# Patient Record
Sex: FEMALE | Race: WHITE
[De-identification: names, ages, dates, MRNs, and addresses within clinical notes are randomized per-mention and may not be internally consistent; named-entity substitution may affect disease eponyms.]

---

## 2018-07-20 ENCOUNTER — HOSPITAL ENCOUNTER (EMERGENCY)
Dept: HOSPITAL 47 - EC | Age: 68
Discharge: HOME | End: 2018-07-20
Payer: MEDICARE

## 2018-07-20 VITALS
SYSTOLIC BLOOD PRESSURE: 141 MMHG | DIASTOLIC BLOOD PRESSURE: 84 MMHG | RESPIRATION RATE: 17 BRPM | TEMPERATURE: 97.6 F | HEART RATE: 77 BPM

## 2018-07-20 DIAGNOSIS — Z79.899: ICD-10-CM

## 2018-07-20 DIAGNOSIS — L03.115: Primary | ICD-10-CM

## 2018-07-20 DIAGNOSIS — Z79.01: ICD-10-CM

## 2018-07-20 DIAGNOSIS — Z86.79: ICD-10-CM

## 2018-07-20 DIAGNOSIS — Z86.718: ICD-10-CM

## 2018-07-20 LAB
BASOPHILS # BLD AUTO: 0 K/UL (ref 0–0.2)
BASOPHILS NFR BLD AUTO: 0 %
EOSINOPHIL # BLD AUTO: 0.1 K/UL (ref 0–0.7)
EOSINOPHIL NFR BLD AUTO: 2 %
ERYTHROCYTE [DISTWIDTH] IN BLOOD BY AUTOMATED COUNT: 3.97 M/UL (ref 3.8–5.4)
ERYTHROCYTE [DISTWIDTH] IN BLOOD: 13 % (ref 11.5–15.5)
HCT VFR BLD AUTO: 36.6 % (ref 34–46)
HGB BLD-MCNC: 12.8 GM/DL (ref 11.4–16)
LYMPHOCYTES # SPEC AUTO: 1.2 K/UL (ref 1–4.8)
LYMPHOCYTES NFR SPEC AUTO: 17 %
MCH RBC QN AUTO: 32.2 PG (ref 25–35)
MCHC RBC AUTO-ENTMCNC: 35 G/DL (ref 31–37)
MCV RBC AUTO: 92 FL (ref 80–100)
MONOCYTES # BLD AUTO: 0.5 K/UL (ref 0–1)
MONOCYTES NFR BLD AUTO: 7 %
NEUTROPHILS # BLD AUTO: 5 K/UL (ref 1.3–7.7)
NEUTROPHILS NFR BLD AUTO: 72 %
PLATELET # BLD AUTO: 225 K/UL (ref 150–450)
WBC # BLD AUTO: 6.9 K/UL (ref 3.8–10.6)

## 2018-07-20 PROCEDURE — 93971 EXTREMITY STUDY: CPT

## 2018-07-20 PROCEDURE — 85025 COMPLETE CBC W/AUTO DIFF WBC: CPT

## 2018-07-20 PROCEDURE — 36415 COLL VENOUS BLD VENIPUNCTURE: CPT

## 2018-07-20 PROCEDURE — 86140 C-REACTIVE PROTEIN: CPT

## 2018-07-20 PROCEDURE — 99284 EMERGENCY DEPT VISIT MOD MDM: CPT

## 2018-07-20 NOTE — US
EXAMINATION TYPE: US venous doppler duplex LE RT

 

DATE OF EXAM: 7/20/2018 9:17 PM

 

COMPARISON: NONE

 

CLINICAL HISTORY: Pain.

 

SIDE PERFORMED: Right  

 

TECHNIQUE:  The lower extremity deep venous system is examined utilizing real time linear array sonog
aureliano with graded compression, doppler sonography and color-flow sonography.

 

VESSELS IMAGED:

External Iliac Vein (EIV)

Common Femoral Vein

Deep Femoral Vein

Greater Saphenous Vein *

Femoral Vein

Popliteal Vein

Small Saphenous Vein *

Proximal Calf Veins

(* superficial vessels)

 

Patient of large body habitus.

 

Right Leg:  Negative for DVT

 

 

 

 

IMPRESSION: Normal exam. No evidence of deep venous thrombosis in the right leg.

## 2018-07-20 NOTE — ED
General Adult HPI





- General


Chief complaint: Extremity Problem,Nontraumatic


Stated complaint: poss DVT


Time Seen by Provider: 07/20/18 21:07


Source: patient, family


Mode of arrival: ambulatory


Limitations: no limitations





- History of Present Illness


Initial comments: 


Female presents with the right leg swelling, redness ongoing for about 24 hours 

now it's painful and numb right calf has swollen in size, she has a history of 

DVT in the right leg and now she is concerned about swelling on the front part 

of the leg below the knee.  She has a history of peripheral vascular disease 

she swallows up with [facility].  She denies any trauma or fall 

onto her right knee denies any trauma no fever no chills no chest pain no 

pleuritic chest pain or abdominal pain no frequency urgency dysuria no symptoms 

of TIA or CVA








- Related Data


 Home Medications











 Medication  Instructions  Recorded  Confirmed


 


Atorvastatin [Lipitor] 20 mg PO DAILY 07/20/18 07/20/18


 


Calcium Carbonate/Vitamin D3 1 tab PO DAILY 07/20/18 07/20/18





[Calcium 600-Vit D3 400 Caplet]   


 


Celecoxib [CeleBREX] 200 mg PO DAILY 07/20/18 07/20/18


 


Cholecalciferol [Vitamin D3] 1,000 unit PO QID 07/20/18 07/20/18


 


Warfarin [Coumadin] 5 mg PO MOWEFR 07/20/18 07/20/18


 


Warfarin [Coumadin] 5 mg PO SUTUTHSA 07/20/18 07/20/18








 Previous Rx's











 Medication  Instructions  Recorded


 


Amoxicillin/Potassium Clav 1 tab PO Q12HR #20 tab 07/20/18





[Augmentin 875-125 Tablet]  











 Allergies











Allergy/AdvReac Type Severity Reaction Status Date / Time


 


No Known Allergies Allergy   Verified 07/20/18 21:34














Review of Systems


ROS Statement: 


Those systems with pertinent positive or pertinent negative responses have been 

documented in the HPI.





ROS Other: All systems not noted in ROS Statement are negative.





Past Medical History


Additional Past Medical History / Comment(s): right leg swelling


History of Any Multi-Drug Resistant Organisms: None Reported


Additional Past Surgical History / Comment(s): vascular surgery right leg


Past Psychological History: No Psychological Hx Reported


Smoking Status: Never smoker


Past Alcohol Use History: None Reported


Past Drug Use History: None Reported





General Exam





- General Exam Comments


Initial Comments: 


General:  The patient is awake and alert, in no distress, and does not appear 

acutely ill. 


Skin:  Skin is warm and examination of the right leg shows circumference of the 

right calf is larger than the left one she also has a area about 10 x 10 cm 

below the knee is erythematous is warm and is tender to touch she is also 

tender over the posterior side of the right lower extremity, no neurovascular 

compromise noticed of the right distal leg and the foot 


Eye:  Pupils are equal, round and reactive to light, extra-ocular movements are 

intact; there is normal conjunctiva bilaterally.  


Ears, nose, mouth and throat:  There are moist mucous membranes and no oral 

lesions. 


Neck:  The neck is supple, there is no tenderness  or JVD.  


Cardiovascular:  There is a regular rate and rhythm. No murmur, rub or gallop 

is appreciated.


Respiratory: To auscultation bilateral, no wheezing no rhonchi no distress  

respiratory wise noticed


Gastrointestinal:  Soft, non-distended, non-tender abdomen without masses or 

organomegaly noted. There is no rebound or guarding present. Bowel sounds are 

unremarkable. 


Back:  There is no tenderness to palpation in the midline. There is no obvious 

deformity.


Musculoskeletal:  Normal ROM, no tenderness, There is no pedal edema. There is 

no calf tenderness or swelling. No cords were appreciated.  


Neurological:  CN II-XII intact, Cranial nerves III through XII are intact. 

There are no obvious motor or sensory deficits. Coordination appears grossly 

intact. Speech is normal.


Psychiatric:  Cooperative, appropriate mood & affect, normal judgment.  








Limitations: no limitations





Course


 Vital Signs











  07/20/18





  19:39


 


Temperature 98.7 F


 


Pulse Rate 98


 


Respiratory 20





Rate 


 


Blood Pressure 154/66


 


O2 Sat by Pulse 97





Oximetry 














Medical Decision Making





- Lab Data


Result diagrams: 


 07/20/18 21:30





 Lab Results











  07/20/18 07/20/18 Range/Units





  21:30 21:30 


 


WBC   6.9  (3.8-10.6)  k/uL


 


RBC   3.97  (3.80-5.40)  m/uL


 


Hgb   12.8  (11.4-16.0)  gm/dL


 


Hct   36.6  (34.0-46.0)  %


 


MCV   92.0  (80.0-100.0)  fL


 


MCH   32.2  (25.0-35.0)  pg


 


MCHC   35.0  (31.0-37.0)  g/dL


 


RDW   13.0  (11.5-15.5)  %


 


Plt Count   225  (150-450)  k/uL


 


Neutrophils %   72  %


 


Lymphocytes %   17  %


 


Monocytes %   7  %


 


Eosinophils %   2  %


 


Basophils %   0  %


 


Neutrophils #   5.0  (1.3-7.7)  k/uL


 


Lymphocytes #   1.2  (1.0-4.8)  k/uL


 


Monocytes #   0.5  (0-1.0)  k/uL


 


Eosinophils #   0.1  (0-0.7)  k/uL


 


Basophils #   0.0  (0-0.2)  k/uL


 


C-Reactive Protein  48.2 H   (<10.0)  mg/L














Disposition


Clinical Impression: 


 Right leg swelling, Cellulitis, H/O peripheral vascular disease





Disposition: HOME SELF-CARE


Condition: Good


Instructions:  Cellulitis (ED)


Additional Instructions: 


She is visiting [place] she plans to go back home in 2 days she was advised 

to follow-up with family doctor or return to the ER if symptoms get worse


Prescriptions: 


Amoxicillin/Potassium Clav [Augmentin 875-125 Tablet] 1 tab PO Q12HR #20 tab


Is patient prescribed a controlled substance at d/c from ED?: No


Referrals: 


Nonstaff,Physician [Primary Care Provider] - 1-2 days

## 2021-10-18 DIAGNOSIS — I83.893 VARICOSE VEINS OF BOTH LEGS WITH EDEMA: ICD-10-CM

## 2021-10-18 DIAGNOSIS — I89.0 LYMPHEDEMA OF BOTH LOWER EXTREMITIES: ICD-10-CM

## 2021-10-18 RX ORDER — ATORVASTATIN CALCIUM 20 MG/1
20 TABLET, FILM COATED ORAL DAILY
COMMUNITY

## 2021-10-18 RX ORDER — WARFARIN SODIUM 5 MG/1
5 TABLET ORAL
COMMUNITY

## 2021-10-18 RX ORDER — PHENOL 1.4 %
1 AEROSOL, SPRAY (ML) MUCOUS MEMBRANE DAILY
COMMUNITY

## 2021-10-18 RX ORDER — METHYLPREDNISOLONE 4 MG/1
4 TABLET ORAL SEE ADMIN INSTRUCTIONS
COMMUNITY
End: 2021-11-23

## 2021-10-26 ENCOUNTER — HOSPITAL ENCOUNTER (OUTPATIENT)
Dept: LAB | Age: 71
Discharge: HOME | End: 2021-10-26
Payer: MEDICARE

## 2021-10-26 ENCOUNTER — HOSPITAL ENCOUNTER (OUTPATIENT)
Dept: WOUND CARE | Age: 71
Discharge: HOME | End: 2021-10-27
Payer: MEDICARE

## 2021-10-26 VITALS
SYSTOLIC BLOOD PRESSURE: 130 MMHG | WEIGHT: 212 LBS | BODY MASS INDEX: 39.01 KG/M2 | HEART RATE: 98 BPM | TEMPERATURE: 98.4 F | DIASTOLIC BLOOD PRESSURE: 88 MMHG | HEIGHT: 62 IN | RESPIRATION RATE: 20 BRPM

## 2021-10-26 DIAGNOSIS — Z79.01 ANTICOAGULATED: ICD-10-CM

## 2021-10-26 DIAGNOSIS — I87.2 VENOUS STASIS DERMATITIS OF RIGHT LOWER EXTREMITY: ICD-10-CM

## 2021-10-26 DIAGNOSIS — I83.893 VARICOSE VEINS OF BOTH LEGS WITH EDEMA: Primary | ICD-10-CM

## 2021-10-26 DIAGNOSIS — I83.013 VENOUS STASIS ULCER OF ANKLE, RIGHT (HCC): ICD-10-CM

## 2021-10-26 DIAGNOSIS — E78.49 OTHER HYPERLIPIDEMIA: ICD-10-CM

## 2021-10-26 DIAGNOSIS — Z86.718 HISTORY OF DVT (DEEP VEIN THROMBOSIS): ICD-10-CM

## 2021-10-26 DIAGNOSIS — E66.09 CLASS 2 OBESITY DUE TO EXCESS CALORIES WITH BODY MASS INDEX (BMI) OF 38.0 TO 38.9 IN ADULT, UNSPECIFIED WHETHER SERIOUS COMORBIDITY PRESENT: ICD-10-CM

## 2021-10-26 DIAGNOSIS — E80.6 HYPERBILIRUBINEMIA: ICD-10-CM

## 2021-10-26 DIAGNOSIS — I87.2 VENOUS INSUFFICIENCY: ICD-10-CM

## 2021-10-26 DIAGNOSIS — L97.319 VENOUS STASIS ULCER OF ANKLE, RIGHT (HCC): ICD-10-CM

## 2021-10-26 PROBLEM — E66.9 CLASS 2 OBESITY IN ADULT: Status: ACTIVE | Noted: 2021-10-26

## 2021-10-26 LAB
ABSOLUTE EOS #: 0.09 K/UL (ref 0–0.44)
ABSOLUTE IMMATURE GRANULOCYTE: <0.03 K/UL (ref 0–0.3)
ABSOLUTE LYMPH #: 1.03 K/UL (ref 1.1–3.7)
ABSOLUTE MONO #: 0.46 K/UL (ref 0.1–1.2)
ALBUMIN SERPL-MCNC: 4.4 G/DL (ref 3.5–5.2)
ALBUMIN/GLOBULIN RATIO: 1.7 (ref 1–2.5)
ALP BLD-CCNC: 98 U/L (ref 35–104)
ALT SERPL-CCNC: 28 U/L (ref 5–33)
ANION GAP SERPL CALCULATED.3IONS-SCNC: 10 MMOL/L (ref 9–17)
AST SERPL-CCNC: 23 U/L
BASOPHILS # BLD: 1 % (ref 0–2)
BASOPHILS ABSOLUTE: 0.04 K/UL (ref 0–0.2)
BILIRUB SERPL-MCNC: 2.03 MG/DL (ref 0.3–1.2)
BUN BLDV-MCNC: 19 MG/DL (ref 8–23)
BUN/CREAT BLD: 28 (ref 9–20)
C-REACTIVE PROTEIN: 7 MG/L (ref 0–5)
CALCIUM SERPL-MCNC: 10 MG/DL (ref 8.6–10.4)
CHLORIDE BLD-SCNC: 103 MMOL/L (ref 98–107)
CO2: 28 MMOL/L (ref 20–31)
CREAT SERPL-MCNC: 0.69 MG/DL (ref 0.5–0.9)
DIFFERENTIAL TYPE: ABNORMAL
EOSINOPHILS RELATIVE PERCENT: 2 % (ref 1–4)
GFR AFRICAN AMERICAN: >60 ML/MIN
GFR NON-AFRICAN AMERICAN: >60 ML/MIN
GFR SERPL CREATININE-BSD FRML MDRD: ABNORMAL ML/MIN/{1.73_M2}
GFR SERPL CREATININE-BSD FRML MDRD: ABNORMAL ML/MIN/{1.73_M2}
GLUCOSE BLD-MCNC: 97 MG/DL (ref 70–99)
HCT VFR BLD CALC: 42.5 % (ref 36.3–47.1)
HEMOGLOBIN: 14.4 G/DL (ref 11.9–15.1)
IMMATURE GRANULOCYTES: 0 %
LYMPHOCYTES # BLD: 18 % (ref 24–43)
MCH RBC QN AUTO: 32.9 PG (ref 25.2–33.5)
MCHC RBC AUTO-ENTMCNC: 33.9 G/DL (ref 25.2–33.5)
MCV RBC AUTO: 97 FL (ref 82.6–102.9)
MONOCYTES # BLD: 8 % (ref 3–12)
NRBC AUTOMATED: 0 PER 100 WBC
PDW BLD-RTO: 12.2 % (ref 11.8–14.4)
PLATELET # BLD: 233 K/UL (ref 138–453)
PLATELET ESTIMATE: ABNORMAL
PMV BLD AUTO: 10.6 FL (ref 8.1–13.5)
POTASSIUM SERPL-SCNC: 4 MMOL/L (ref 3.7–5.3)
RBC # BLD: 4.38 M/UL (ref 3.95–5.11)
RBC # BLD: ABNORMAL 10*6/UL
SEG NEUTROPHILS: 71 % (ref 36–65)
SEGMENTED NEUTROPHILS ABSOLUTE COUNT: 4.03 K/UL (ref 1.5–8.1)
SODIUM BLD-SCNC: 141 MMOL/L (ref 135–144)
TOTAL PROTEIN: 7 G/DL (ref 6.4–8.3)
WBC # BLD: 5.7 K/UL (ref 3.5–11.3)
WBC # BLD: ABNORMAL 10*3/UL

## 2021-10-26 PROCEDURE — 36415 COLL VENOUS BLD VENIPUNCTURE: CPT

## 2021-10-26 PROCEDURE — 80053 COMPREHEN METABOLIC PANEL: CPT

## 2021-10-26 PROCEDURE — 87176 TISSUE HOMOGENIZATION CULTR: CPT

## 2021-10-26 PROCEDURE — 87205 SMEAR GRAM STAIN: CPT

## 2021-10-26 PROCEDURE — 86140 C-REACTIVE PROTEIN: CPT

## 2021-10-26 PROCEDURE — 87075 CULTR BACTERIA EXCEPT BLOOD: CPT

## 2021-10-26 PROCEDURE — 85025 COMPLETE CBC W/AUTO DIFF WBC: CPT

## 2021-10-26 PROCEDURE — 87070 CULTURE OTHR SPECIMN AEROBIC: CPT

## 2021-10-26 PROCEDURE — 99213 OFFICE O/P EST LOW 20 MIN: CPT

## 2021-10-26 RX ORDER — CELECOXIB 200 MG/1
CAPSULE ORAL
COMMUNITY
Start: 2021-10-06 | End: 2021-10-26

## 2021-10-26 RX ORDER — CELECOXIB 200 MG/1
CAPSULE ORAL
COMMUNITY
Start: 2021-04-30

## 2021-10-26 NOTE — PROGRESS NOTES
Referred by Dr. Tony Sorto for management of right foot/ankle wound. Not clear how long this wound has been present but she has had issues on and off for a long time. For quite a while she was being managed by vascular team in [facility] but apparently now lives in [facility] and is switching her care to local doctors. She has had quite a few vascular procedures in the past including vein ablation and injections. She has an ulcer on her right foot medial surface just inferior and posterior to her medial malleolus. She has been treating with topical antibiotics and she chronically uses compression stockings. Is mildly tender. She is getting quite a bit of serous drainage from the wound. Past Medical History:   Diagnosis Date    DVT (deep venous thrombosis) (HCC)     RLE    Osteoarthritis      Past Surgical History:   Procedure Laterality Date    COLONOSCOPY  2012    HYSTERECTOMY, TOTAL ABDOMINAL  1999     Current Outpatient Medications   Medication Sig Dispense Refill    mupirocin (BACTROBAN) 2 % ointment       celecoxib (CELEBREX) 200 MG capsule take 1 capsule by mouth once daily      atorvastatin (LIPITOR) 20 MG tablet Take 20 mg by mouth daily      calcium carbonate 600 MG TABS tablet Take 1 tablet by mouth daily      vitamin D (CHOLECALCIFEROL) 25 MCG (1000 UT) TABS tablet Take 1,000 Units by mouth daily Takes 4x/day. Per pharmacy as directed      methylPREDNISolone (MEDROL DOSEPACK) 4 MG tablet Take 4 mg by mouth See Admin Instructions Take by mouth.  warfarin (COUMADIN) 5 MG tablet Take 5 mg by mouth Takes 1-1.5 tabs (5-7.5 mg total) by mouth daily. No current facility-administered medications for this encounter.      No Known Allergies  Social History     Tobacco Use    Smoking status: Never Smoker    Smokeless tobacco: Never Used   Vaping Use    Vaping Use: Never used   Substance Use Topics    Alcohol use: Not Currently     Alcohol/week: 0.0 - 1.0 standard drinks    Drug use: Never     Family History   Problem Relation Age of Onset    Diabetes Mother     Coronary Art Dis Mother     Arthritis Mother     High Blood Pressure Mother     Cancer Father        /88   Pulse 98   Temp 98.4 °F (36.9 °C) (Tympanic)   Resp 20   Ht 5' 2\" (1.575 m)   Wt 212 lb (96.2 kg)   BMI 38.78 kg/m²         Krysten shaped ulcer, leaking/dripping serous fluid. Layer of yellowish fibrin. Mildly tender. Her leg shows deep hyperpigmentation and some sclerotic change of the skin consistent with venous stasis. .  She has varicosity above and below the abnormal skin. She has good pedal pulsed. She has thicken nails    Procedure note:  Carlos Balm wound(s) debrided. Devitalized, necrotic, fibrinous material and biofilm was removed. Hemostasis by direct pressure as needed. Instruments used:   Curette: Yes   Forceps and scissors:  No   Scalpel: No   Tissue nippers or rongeur: No  Additional injected local anesthetic: No  Tissue obtained for culture: Yes  Additional hemostatic agents used:   Silver Nitrate: No   Electrocautery: No   Sutures: No   Topical agents (gel foam, thrombin, Surgicel): No  Depth of Debridement - full thickness  Debridement Size:  Roughly 5.5 cm2        IMP/PLAN  1) Venous stasis ulcer left foot  - base line lab work  - Compression dressing - full compression  - Silver collagen  - Secondary absorptive dressing  - Recheck in 1 week.   2) Venous stasis dermatitis

## 2021-11-01 LAB
CULTURE: NORMAL
DIRECT EXAM: NORMAL
DIRECT EXAM: NORMAL
Lab: NORMAL
SPECIMEN DESCRIPTION: NORMAL

## 2021-11-02 ENCOUNTER — HOSPITAL ENCOUNTER (OUTPATIENT)
Dept: WOUND CARE | Age: 71
Discharge: HOME | End: 2021-11-03
Payer: MEDICARE

## 2021-11-02 VITALS
WEIGHT: 211.2 LBS | BODY MASS INDEX: 38.87 KG/M2 | SYSTOLIC BLOOD PRESSURE: 130 MMHG | DIASTOLIC BLOOD PRESSURE: 88 MMHG | TEMPERATURE: 97.3 F | HEART RATE: 84 BPM | RESPIRATION RATE: 18 BRPM | HEIGHT: 62 IN

## 2021-11-02 PROCEDURE — 29581 APPL MULTLAYER CMPRN SYS LEG: CPT

## 2021-11-02 PROCEDURE — 11042 DBRDMT SUBQ TIS 1ST 20SQCM/<: CPT | Performed by: SURGERY

## 2021-11-02 PROCEDURE — 99212 OFFICE O/P EST SF 10 MIN: CPT

## 2021-11-02 NOTE — PROGRESS NOTES
Referred by Dr. Kady Ram for management of right foot/ankle wound. Not clear how long this wound has been present but she has had issues on and off for a long time. For quite a while she was being managed by vascular team in [facility] but apparently now lives in [facility] and is switching her care to local doctors. She has had quite a few vascular procedures in the past including vein ablation and injections. Her first visit with us was 10/26/2021    Compression and silver collagen    /88   Pulse 84   Temp 97.3 °F (36.3 °C) (Tympanic)   Resp 18   Ht 5' 2\" (1.575 m)   Wt 211 lb 3.2 oz (95.8 kg)   BMI 38.63 kg/m²     Her lab work was largely okay. She has a mildly elevated bilirubin. The wound is a little more distinct this week. The is one bryan shaped ulcer that has irregular borders, and several smaller surrouding ulcers. It was discolored by the silver collagen. It is clearly full thickness. Measure a little smaller this week, perhaps because the borders of the ulcer are more distinct. Procedure note:  Levora Zachary wound(s) debrided. Devitalized, necrotic, fibrinous material and biofilm was removed. Hemostasis by direct pressure as needed. Instruments used:   Curette: Yes   Forceps and scissors:  No   Scalpel: No   Tissue nippers or rongeur: No  Additional injected local anesthetic: No  Tissue obtained for culture: No  Additional hemostatic agents used:   Silver Nitrate: No   Electrocautery: No   Sutures: No   Topical agents (gel foam, thrombin, Surgicel): No  Depth of Debridement - full thickness  Debridement Size:  Roughly 4 cm2    Culture from last week was negative. IMP/PLAN    1)  Continue compression and silver collagen  - I suspect this is not going to heal very well, but will give it one more week  - Consider the use of skin substitutes - Appligraft, Dermagraft, Puraply or similar.   2) Recheck in 1 week

## 2021-11-09 ENCOUNTER — HOSPITAL ENCOUNTER (OUTPATIENT)
Dept: WOUND CARE | Age: 71
Discharge: HOME | End: 2021-11-10
Payer: MEDICARE

## 2021-11-09 VITALS
HEIGHT: 62 IN | RESPIRATION RATE: 18 BRPM | DIASTOLIC BLOOD PRESSURE: 74 MMHG | SYSTOLIC BLOOD PRESSURE: 126 MMHG | TEMPERATURE: 99 F | WEIGHT: 211 LBS | BODY MASS INDEX: 38.83 KG/M2 | HEART RATE: 72 BPM

## 2021-11-09 PROCEDURE — 99213 OFFICE O/P EST LOW 20 MIN: CPT

## 2021-11-09 PROCEDURE — 11042 DBRDMT SUBQ TIS 1ST 20SQCM/<: CPT | Performed by: SURGERY

## 2021-11-09 PROCEDURE — 29581 APPL MULTLAYER CMPRN SYS LEG: CPT

## 2021-11-09 PROCEDURE — 86403 PARTICLE AGGLUT ANTBDY SCRN: CPT

## 2021-11-09 PROCEDURE — 87075 CULTR BACTERIA EXCEPT BLOOD: CPT

## 2021-11-09 PROCEDURE — 15271 SKIN SUB GRAFT TRNK/ARM/LEG: CPT | Performed by: SURGERY

## 2021-11-09 PROCEDURE — C5271 LOW COST SKIN SUBSTITUTE APP: HCPCS | Performed by: SURGERY

## 2021-11-09 PROCEDURE — 87070 CULTURE OTHR SPECIMN AEROBIC: CPT

## 2021-11-09 PROCEDURE — 87205 SMEAR GRAM STAIN: CPT

## 2021-11-09 PROCEDURE — 87176 TISSUE HOMOGENIZATION CULTR: CPT

## 2021-11-09 PROCEDURE — 87186 SC STD MICRODIL/AGAR DIL: CPT

## 2021-11-09 NOTE — PROGRESS NOTES
PuraPly AMTreatment Note    NAME:  Avram Frankel  YOB: 1950  MEDICAL RECORD NUMBER:  [id number]  DATE:  11/9/2021    Goal:  Patient will receive safe and proper application of skin substitute. Patient will comply with caring for dressing, and reporting complications. Expiration date checked immediately prior to use. Package intact prior to use and no damage noted. Transport temperature controlled and acceptable. PuraPly AM was removed from protective sterile packaging by provider and applied to prepared ulcer bed. PuraPly AM was hydrated with sterile normal saline per provider. PuraPly AM was applied to right ankle and affixed with steri-strips by the provider. PuraPly AM was covered with non-adherent ulcer dressing. Applied dry gauze and/or roll gauze. Patient/caregiver was instructed not to remove dressing and to keep it clean and dry. Pt/family/caregiver was instructed on signs and symptoms of complications to report such as draining through dressing, dressing falling down/slipping, getting wet, or severe pain or tingling. PuraPly AM may be applied a total of 10 times per wound over a 12 week period. Date of first application of PuraPly for this current wound is November 9, 2021.     Guidelines followed   Puraply 2x4   5/12 application     Electronically signed by Genoveva Aguilar RN on 11/9/2021 at 2:01 PM

## 2021-11-09 NOTE — DISCHARGE INSTR - COC
Continuity of Care Form    Patient Name: Rudy Keene   :  1950  MRN:  [id number]    Admit date:  2021  Discharge date:  ***    Code Status Order: No Order   Advance Directives:      Admitting Physician:  No admitting provider for patient encounter. PCP: Austin Nguyen DO    Discharging Nurse: [facility] Unit/Room#: No information available for this encounter. Discharging Unit Phone Number: ***    Emergency Contact:   Extended Emergency Contact Information  Primary Emergency Contact: [employer]  Mobile Phone: [phone]  Relation: Spouse    Past Surgical History:  Past Surgical History:   Procedure Laterality Date    COLONOSCOPY      HYSTERECTOMY, TOTAL ABDOMINAL         Immunization History: There is no immunization history for the selected administration types on file for this patient.     Active Problems:  Patient Active Problem List   Diagnosis Code    Lymphedema of both lower extremities I89.0    Varicose veins of both legs with edema I83.893    Venous stasis ulcer of ankle, right (HCC) I83.013, L97.319    Hyperbilirubinemia E80.6    History of DVT (deep vein thrombosis) Z86.718    Other hyperlipidemia E78.49    Class 2 obesity in adult E66.9    Anticoagulated Z79.01    Venous stasis dermatitis of right lower extremity I87.2    Venous insufficiency I87.2       Isolation/Infection:   Isolation            No Isolation          Patient Infection Status       None to display            Nurse Assessment:  Last Vital Signs: /74   Pulse 72   Temp 99 °F (37.2 °C) (Tympanic)   Resp 18   Ht 5' 2\" (1.575 m)   Wt 211 lb (95.7 kg)   BMI 38.59 kg/m²     Last documented pain score (0-10 scale): Pain Level: 0  Last Weight:   Wt Readings from Last 1 Encounters:   21 211 lb (95.7 kg)     Mental Status:  {IP PT MENTAL STATUS:20050}    IV Access:  { TIFFANI IV ACCESS:452198049}    Nursing Mobility/ADLs:  Walking   {[facility] BEHO:513765218}  Transfer  {[facility] WIUW:707580140}  Bathing  {CHP DME NSQY:696303056}  Dressing  {CHP DME GXXB:539033263}  Toileting  {CHP DME TYFC:271646288}  Feeding  {CHP DME NRBB:968537561}  Med Admin  {CHP DME PIFN:341637639}  Med Delivery   { TIFFANI MED Delivery:891586796}    Wound Care Documentation and Therapy:  Wound 10/26/21 Ankle Right;Posterior (Active)   Wound Image    11/09/21 1338   Wound Etiology Venous 11/09/21 1338   Dressing Status Old drainage noted 11/09/21 1338   Wound Cleansed Cleansed with saline 11/09/21 1338   Dressing/Treatment Collagen with Ag 11/02/21 1445   Offloading for Diabetic Foot Ulcers No offloading required 11/09/21 1338   Dressing Change Due 10/27/21 10/26/21 1000   Wound Length (cm) 3.5 cm 11/09/21 1338   Wound Width (cm) 1.9 cm 11/09/21 1338   Wound Depth (cm) 0.1 cm 11/09/21 1338   Wound Surface Area (cm^2) 6.65 cm^2 11/09/21 1338   Change in Wound Size % (l*w) -33 11/09/21 1338   Wound Volume (cm^3) 0.665 cm^3 11/09/21 1338   Wound Healing % -33 11/09/21 1338   Post-Procedure Length (cm) 3.5 cm 11/09/21 1338   Post-Procedure Width (cm) 1.9 cm 11/09/21 1338   Post-Procedure Depth (cm) 0.3 cm 11/09/21 1338   Post-Procedure Surface Area (cm^2) 6.65 cm^2 11/09/21 1338   Post-Procedure Volume (cm^3) 1.995 cm^3 11/09/21 1338   Wound Assessment Purple/maroon 11/09/21 1338   Drainage Amount Moderate 11/09/21 1338   Drainage Description Serosanguinous 11/09/21 1338   Odor Mild 11/09/21 1338   Pamela-wound Assessment Blanchable erythema; Warm 11/09/21 1338   Margins Defined edges 11/09/21 1338   Wound Thickness Description not for Pressure Injury Full thickness 11/09/21 1338   Number of days: 14        Elimination:  Continence: Bowel: {YES / XR:54982}  Bladder: {YES / JF:46135}  Urinary Catheter: {Urinary Catheter:906754460}   Colostomy/Ileostomy/Ileal Conduit: {YES / MADHURI:16918}       Date of Last BM: ***  No intake or output data in the 24 hours ending 11/09/21 1412  No intake/output data recorded.     Safety Concerns: [address] [facility] Safety Concerns:251430917}    Impairments/Disabilities:      [address] [facility] Impairments/Disabilities:249088203}    Nutrition Therapy:  Current Nutrition Therapy:   [address] [facility] Diet List:749921120}    Routes of Feeding: {CHP DME Other Feedings:522558387}  Liquids: {Slp liquid thickness:17976}  Daily Fluid Restriction: {CHP DME Yes amt example:396295855}  Last Modified Barium Swallow with Video (Video Swallowing Test): {Done Not Done OOZF:627076731}    Treatments at the Time of Hospital Discharge:   Respiratory Treatments: ***  Oxygen Therapy:  {Therapy; copd oxygen:46532}  Ventilator:    {MH CC Vent MSBA:925445887}    Rehab Therapies: {THERAPEUTIC INTERVENTION:9140976262}  Weight Bearing Status/Restrictions: [address]  Weight Bearin}  Other Medical Equipment (for information only, NOT a DME order):  {EQUIPMENT:831771446}  Other Treatments: ***    Patient's personal belongings (please select all that are sent with patient):  {[facility] DME Belongings:385380510}    RN SIGNATURE:  {Esignature:987734348}    CASE MANAGEMENT/SOCIAL WORK SECTION    Inpatient Status Date: ***    Readmission Risk Assessment Score:  Readmission Risk              Risk of Unplanned Readmission:  0           Discharging to Facility/ Agency   Name:   Address:  Phone:  Fax:    Dialysis Facility (if applicable)   Name:  Address:  Dialysis Schedule:  Phone:  Fax:    / signature: {Esignature:050096710}    PHYSICIAN SECTION    Prognosis: {Prognosis:1066708554}    Condition at Discharge: [address] Patient Condition:314061563}    Rehab Potential (if transferring to Rehab): {Prognosis:4101843797}    Recommended Labs or Other Treatments After Discharge: ***    Physician Certification: I certify the above information and transfer of Lior Grissom  is necessary for the continuing treatment of the diagnosis listed and that she requires {Admit to Appropriate Level of Care:25198} for {GREATER/LESS:705188331} 30 days.      Update Admission H&P: {CHP DME Changes in YCPOX:236134772}    PHYSICIAN SIGNATURE:  {Esignature:402722215}

## 2021-11-09 NOTE — PROGRESS NOTES
foot/ankle wound.  Not clear how long this wound has been present but she has had issues on and off for a long time.  For quite a while she was being managed by vascular team in [facility] but apparently now lives in [facility] and is switching her care to local doctors. Hernando Hendricks has had quite a few vascular procedures in the past including vein ablation and injections. Her first visit with us was 10/26/2021     Compression and silver collagen    No complaints. /74   Pulse 72   Temp 99 °F (37.2 °C) (Tympanic)   Resp 18   Ht 5' 2\" (1.575 m)   Wt 211 lb (95.7 kg)   BMI 38.59 kg/m²         Wound measures larger  It is moderately tender. The tissue is darkened, dusky vs venous stasis changes. The was a mild odor from the wound today. Leg edema is controlled    Procedure note:  Beth Robinsons wound(s) debrided. Devitalized, necrotic, fibrinous material and biofilm was removed. Hemostasis by direct pressure as needed. Instruments used:   Curette: Yes   Forceps and scissors:  No   Scalpel: No   Tissue nippers or rongeur: No  Additional injected local anesthetic: No  Tissue obtained for culture: Yes  Additional hemostatic agents used:   Silver Nitrate: No   Electrocautery: No   Sutures: No   Topical agents (gel foam, thrombin, Surgicel): No  Depth of Debridement - subcutaneous  Debridement Size:  Roughly 6.6 cm2    Puraply was place over the wound. Secured in place with Wound Veil and steri-strips. IMP/PLAN  1) Venous Stasis Ulcer Left Ankle - Bigger than last week  - Culture repeated today  - Puraply placed. - If no improvement, she may need a tissue biopsy.

## 2021-11-11 ENCOUNTER — TELEPHONE (OUTPATIENT)
Dept: WOUND CARE | Age: 71
End: 2021-11-11

## 2021-11-11 RX ORDER — CEPHALEXIN 500 MG/1
1000 CAPSULE ORAL 2 TIMES DAILY
Qty: 40 CAPSULE | Refills: 0 | Status: SHIPPED | OUTPATIENT
Start: 2021-11-11 | End: 2021-11-21

## 2021-11-13 LAB
CULTURE: ABNORMAL
CULTURE: ABNORMAL
DIRECT EXAM: ABNORMAL
DIRECT EXAM: ABNORMAL
Lab: ABNORMAL
SPECIMEN DESCRIPTION: ABNORMAL

## 2021-11-16 ENCOUNTER — HOSPITAL ENCOUNTER (OUTPATIENT)
Dept: WOUND CARE | Age: 71
Discharge: HOME | End: 2021-11-17
Payer: MEDICARE

## 2021-11-16 VITALS
HEART RATE: 74 BPM | SYSTOLIC BLOOD PRESSURE: 138 MMHG | WEIGHT: 214 LBS | RESPIRATION RATE: 18 BRPM | BODY MASS INDEX: 39.38 KG/M2 | TEMPERATURE: 98.5 F | HEIGHT: 62 IN | DIASTOLIC BLOOD PRESSURE: 86 MMHG

## 2021-11-16 PROCEDURE — 29581 APPL MULTLAYER CMPRN SYS LEG: CPT

## 2021-11-16 PROCEDURE — 99213 OFFICE O/P EST LOW 20 MIN: CPT

## 2021-11-16 NOTE — DISCHARGE INSTR - COC
Continuity of Care Form    Patient Name: Sharon Sharma   :  1950  MRN:  [id number]    6 [facility] date:  2021  Discharge date:  ***    Code Status Order: No Order   Advance Directives:      Admitting Physician:  No admitting provider for patient encounter. PCP: Braydon Moore DO    Discharging Nurse: [facility] Unit/Room#: No information available for this encounter. Discharging Unit Phone Number: ***    Emergency Contact:   Extended Emergency Contact Information  Primary Emergency Contact: [address] [place] [address]  Mobile Phone: [phone]  Relation: Spouse    Past Surgical History:  Past Surgical History:   Procedure Laterality Date    COLONOSCOPY      HYSTERECTOMY, TOTAL ABDOMINAL         Immunization History: There is no immunization history for the selected administration types on file for this patient.     Active Problems:  Patient Active Problem List   Diagnosis Code    Lymphedema of both lower extremities I89.0    Varicose veins of both legs with edema I83.893    Venous stasis ulcer of ankle, right (HCC) I83.013, L97.319    Hyperbilirubinemia E80.6    History of DVT (deep vein thrombosis) Z86.718    Other hyperlipidemia E78.49    Class 2 obesity in adult E66.9    Anticoagulated Z79.01    Venous stasis dermatitis of right lower extremity I87.2    Venous insufficiency I87.2       Isolation/Infection:   Isolation            No Isolation          Patient Infection Status       None to display            Nurse Assessment:  Last Vital Signs: /86   Pulse 74   Temp 98.5 °F (36.9 °C) (Tympanic)   Resp 18   Ht 5' 2\" (1.575 m)   Wt 214 lb (97.1 kg)   BMI 39.14 kg/m²     Last documented pain score (0-10 scale): Pain Level: 0  Last Weight:   Wt Readings from Last 1 Encounters:   21 214 lb (97.1 kg)     Mental Status:  {IP PT MENTAL STATUS:37789}    IV Access:  {[facility] IV ACCESS:041915043}    Nursing Mobility/ADLs:  Walking   {[facility] ROGE YGYB:678738483}  Transfer  {[facility] ROGE Safety Concerns:314960470}    Impairments/Disabilities:      { TIFFANI Impairments/Disabilities:524719148}    Nutrition Therapy:  Current Nutrition Therapy:   [address] TIFFANI Diet List:479148749}    Routes of Feeding: {CHP DME Other Feedings:657669536}  Liquids: {Slp liquid thickness:67735}  Daily Fluid Restriction: {CHP DME Yes amt example:213092019}  Last Modified Barium Swallow with Video (Video Swallowing Test): {Done Not Done NCAB:613882845}    Treatments at the Time of Hospital Discharge:   Respiratory Treatments: ***  Oxygen Therapy:  {Therapy; copd oxygen:18044}  Ventilator:    { CC Vent HSYT:026660853}    Rehab Therapies: {THERAPEUTIC INTERVENTION:1325842948}  Weight Bearing Status/Restrictions: 50Glenn Rosario CC Weight Bearin}  Other Medical Equipment (for information only, NOT a DME order):  {EQUIPMENT:042847200}  Other Treatments: ***    Patient's personal belongings (please select all that are sent with patient):  {[facility] DME Belongings:992363201}    RN SIGNATURE:  {Esignature:740630060}    CASE MANAGEMENT/SOCIAL WORK SECTION    Inpatient Status Date: ***    Readmission Risk Assessment Score:  Readmission Risk              Risk of Unplanned Readmission:  0           Discharging to Facility/ Agency   Name:   Address:  Phone:  Fax:    Dialysis Facility (if applicable)   Name:  Address:  Dialysis Schedule:  Phone:  Fax:    / signature: {Esignature:940098182}    PHYSICIAN SECTION    Prognosis: {Prognosis:2129371604}    Condition at Discharge: 50Glenn Rosario Patient Condition:400717517}    Rehab Potential (if transferring to Rehab): {Prognosis:8222662026}    Recommended Labs or Other Treatments After Discharge: ***    Physician Certification: I certify the above information and transfer of Karla Del Angel  is necessary for the continuing treatment of the diagnosis listed and that she requires {Admit to Appropriate Level of Care:28224} for {GREATER/LESS:345827341} 30 days.      Update Admission H&P: {P DME Changes in KYRTO:028463913}    PHYSICIAN SIGNATURE:  {Esignature:945630478}

## 2021-11-23 ENCOUNTER — HOSPITAL ENCOUNTER (OUTPATIENT)
Dept: WOUND CARE | Age: 71
Discharge: HOME | End: 2021-11-24
Payer: MEDICARE

## 2021-11-23 VITALS
SYSTOLIC BLOOD PRESSURE: 130 MMHG | DIASTOLIC BLOOD PRESSURE: 70 MMHG | BODY MASS INDEX: 39.01 KG/M2 | TEMPERATURE: 98 F | RESPIRATION RATE: 18 BRPM | HEART RATE: 99 BPM | WEIGHT: 212 LBS | HEIGHT: 62 IN

## 2021-11-23 DIAGNOSIS — G89.18 PAIN ASSOCIATED WITH SURGICAL PROCEDURE: Primary | ICD-10-CM

## 2021-11-23 PROCEDURE — 15271 SKIN SUB GRAFT TRNK/ARM/LEG: CPT | Performed by: SURGERY

## 2021-11-23 PROCEDURE — 99213 OFFICE O/P EST LOW 20 MIN: CPT

## 2021-11-23 PROCEDURE — 11042 DBRDMT SUBQ TIS 1ST 20SQCM/<: CPT | Performed by: SURGERY

## 2021-11-23 PROCEDURE — 29581 APPL MULTLAYER CMPRN SYS LEG: CPT

## 2021-11-23 RX ORDER — HYDROCODONE BITARTRATE AND ACETAMINOPHEN 5; 325 MG/1; MG/1
1 TABLET ORAL EVERY 6 HOURS PRN
Qty: 30 TABLET | Refills: 0 | Status: SHIPPED | OUTPATIENT
Start: 2021-11-23 | End: 2021-11-30

## 2021-11-23 ASSESSMENT — PAIN SCALES - GENERAL: PAINLEVEL_OUTOF10: 0

## 2021-11-23 NOTE — PROGRESS NOTES
Referred by Dr. Kevin Hobbs for management of right foot/ankle wound.  Not clear how long this wound has been present but she has had issues on and off for a long time.  For quite a while she was being managed by vascular team in [facility] but apparently now lives in [facility] and is switching her care to local doctors. Jud Mullins has had quite a few vascular procedures in the past including vein ablation and injections. Her first visit with us was 10/26/2    Puraply 11/9    Compression and silver collagen    Completed course of cephalexin for culture showing staph. A.    /70   Pulse 99   Temp 98 °F (36.7 °C) (Tympanic)   Resp 18   Ht 5' 2\" (1.575 m)   Wt 212 lb (96.2 kg)   BMI 38.78 kg/m²         There is some dry material around the edges. The wound itself has some pink and yellow exudate on the surface. It is much less violaceous than it has been before. It measures smaller. Procedure note:  Lim Stalling wound(s) debrided. Devitalized, necrotic, fibrinous material and biofilm was removed. Hemostasis by direct pressure as needed. Instruments used:   Curette: No   Forceps and scissors:  No   Scalpel: No   Tissue nippers or rongeur: No  Additional injected local anesthetic: No  Tissue obtained for culture: No  Additional hemostatic agents used:   Silver Nitrate: No   Electrocautery: No   Sutures: No   Topical agents (gel foam, thrombin, Surgicel): No  Depth of Debridement - subcutaneous  Debridement Size:  Roughly 4 cm2    Puraple 4x2 cm place over wound and folded in layers. Secured I place with wound veil and steristrips.     IMP/PLAN  1) Prescribed a few pain pills that she can take prior to visit for procedural pain - her  drives her  2) Clear improvement  3) Continue compression - dressing change next week  4) Follow up with me in 2 weeks

## 2021-11-23 NOTE — PROGRESS NOTES
PuraPly AMTreatment Note    NAME:  Tessa Farrar  YOB: 1950  MEDICAL RECORD NUMBER:  [id number]  DATE:  11/23/2021    Goal:  Patient will receive safe and proper application of skin substitute. Patient will comply with caring for dressing, and reporting complications. Expiration date checked immediately prior to use. Package intact prior to use and no damage noted. Transport temperature controlled and acceptable. PuraPly AM was removed from protective sterile packaging by provider and applied to prepared ulcer bed. PuraPly AM was hydrated with sterile normal saline per provider. PuraPly AM was applied to right ankle  and affixed with steri-strips by the provider. PuraPly AM was covered with non-adherent ulcer dressing. Applied superabsorbant and TLC wrap over non-adherent. Applied dry gauze and/or roll gauze. Patient/caregiver was instructed not to remove dressing and to keep it clean and dry. Pt/family/caregiver was instructed on signs and symptoms of complications to report such as draining through dressing, dressing falling down/slipping, getting wet, or severe pain or tingling. PuraPly AM may be applied a total of 10 times per wound over a 12 week period. Date of first application of PuraPly for this current wound is November 9, 2021.   5/69 application 2x4 puraply    Guidelines followed    Electronically signed by Estrella Sullivan RN on 11/23/2021 at 9:31 AM

## 2021-11-30 ENCOUNTER — HOSPITAL ENCOUNTER (OUTPATIENT)
Dept: WOUND CARE | Age: 71
Discharge: HOME | End: 2021-12-01
Payer: MEDICARE

## 2021-11-30 VITALS
HEIGHT: 62 IN | SYSTOLIC BLOOD PRESSURE: 128 MMHG | WEIGHT: 213 LBS | RESPIRATION RATE: 20 BRPM | BODY MASS INDEX: 39.2 KG/M2 | TEMPERATURE: 98.6 F | DIASTOLIC BLOOD PRESSURE: 84 MMHG | HEART RATE: 72 BPM

## 2021-11-30 PROCEDURE — 29581 APPL MULTLAYER CMPRN SYS LEG: CPT

## 2021-11-30 PROCEDURE — 99213 OFFICE O/P EST LOW 20 MIN: CPT

## 2021-11-30 NOTE — PROGRESS NOTES
Patient presented for dressing change per order of Dr. Rita Azul. Old dressing removed. Periwound and ulcer(s) cleansed with normal saline. New dressing  (collagen,super absorbant and Two-layer compression) applied to right leg. Patient had puraply intact to right leg wound. Copious amount of serosanguinous drainage noted. Mild odor. Instructed by Dr. Rita Azul at patient's last visit to remove skin sub if increased drainage. See wound flow sheet for measurements.

## 2021-12-07 ENCOUNTER — HOSPITAL ENCOUNTER (OUTPATIENT)
Dept: WOUND CARE | Age: 71
Discharge: HOME | End: 2021-12-08
Payer: MEDICARE

## 2021-12-07 VITALS
WEIGHT: 213 LBS | TEMPERATURE: 98 F | HEIGHT: 62 IN | SYSTOLIC BLOOD PRESSURE: 136 MMHG | RESPIRATION RATE: 20 BRPM | HEART RATE: 96 BPM | DIASTOLIC BLOOD PRESSURE: 78 MMHG | BODY MASS INDEX: 39.2 KG/M2

## 2021-12-07 DIAGNOSIS — I83.013 VENOUS STASIS ULCER OF ANKLE, RIGHT (HCC): Primary | ICD-10-CM

## 2021-12-07 DIAGNOSIS — L97.319 VENOUS STASIS ULCER OF ANKLE, RIGHT (HCC): Primary | ICD-10-CM

## 2021-12-07 PROCEDURE — 15271 SKIN SUB GRAFT TRNK/ARM/LEG: CPT | Performed by: SURGERY

## 2021-12-07 PROCEDURE — 29581 APPL MULTLAYER CMPRN SYS LEG: CPT

## 2021-12-07 PROCEDURE — 99213 OFFICE O/P EST LOW 20 MIN: CPT

## 2021-12-07 PROCEDURE — 11042 DBRDMT SUBQ TIS 1ST 20SQCM/<: CPT | Performed by: SURGERY

## 2021-12-07 RX ORDER — LIDOCAINE 50 MG/G
OINTMENT TOPICAL ONCE
Status: CANCELLED | OUTPATIENT
Start: 2021-12-07 | End: 2021-12-07

## 2021-12-07 RX ORDER — LIDOCAINE 40 MG/G
CREAM TOPICAL ONCE
Status: CANCELLED | OUTPATIENT
Start: 2021-12-07 | End: 2021-12-07

## 2021-12-07 RX ORDER — CLOBETASOL PROPIONATE 0.5 MG/G
OINTMENT TOPICAL ONCE
Status: CANCELLED | OUTPATIENT
Start: 2021-12-07 | End: 2021-12-07

## 2021-12-07 RX ORDER — LIDOCAINE HYDROCHLORIDE 20 MG/ML
JELLY TOPICAL ONCE
Status: CANCELLED | OUTPATIENT
Start: 2021-12-07 | End: 2021-12-07

## 2021-12-07 RX ORDER — BACITRACIN, NEOMYCIN, POLYMYXIN B 400; 3.5; 5 [USP'U]/G; MG/G; [USP'U]/G
OINTMENT TOPICAL ONCE
Status: CANCELLED | OUTPATIENT
Start: 2021-12-07 | End: 2021-12-07

## 2021-12-07 RX ORDER — GENTAMICIN SULFATE 1 MG/G
OINTMENT TOPICAL ONCE
Status: CANCELLED | OUTPATIENT
Start: 2021-12-07 | End: 2021-12-07

## 2021-12-07 RX ORDER — HYDROCODONE BITARTRATE AND ACETAMINOPHEN 5; 325 MG/1; MG/1
1 TABLET ORAL EVERY 6 HOURS PRN
COMMUNITY

## 2021-12-07 RX ORDER — LIDOCAINE HYDROCHLORIDE 40 MG/ML
SOLUTION TOPICAL ONCE
Status: CANCELLED | OUTPATIENT
Start: 2021-12-07 | End: 2021-12-07

## 2021-12-07 RX ORDER — BETAMETHASONE DIPROPIONATE 0.05 %
OINTMENT (GRAM) TOPICAL ONCE
Status: CANCELLED | OUTPATIENT
Start: 2021-12-07 | End: 2021-12-07

## 2021-12-07 RX ORDER — GINSENG 100 MG
CAPSULE ORAL ONCE
Status: CANCELLED | OUTPATIENT
Start: 2021-12-07 | End: 2021-12-07

## 2021-12-07 RX ORDER — BACITRACIN ZINC AND POLYMYXIN B SULFATE 500; 1000 [USP'U]/G; [USP'U]/G
OINTMENT TOPICAL ONCE
Status: CANCELLED | OUTPATIENT
Start: 2021-12-07 | End: 2021-12-07

## 2021-12-07 ASSESSMENT — PAIN SCALES - GENERAL: PAINLEVEL_OUTOF10: 0

## 2021-12-07 NOTE — PROGRESS NOTES
Referred by Dr. Carlos Scott for management of right foot/ankle wound.  Not clear how long this wound has been present but she has had issues on and off for a long time.  For quite a while she was being managed by vascular team in [facility] but apparently now lives in [facility] and is switching her care to local doctors. Stephen Pond has had quite a few vascular procedures in the past including vein ablation and injections. Saba Galeano first visit with us was 10/26/2     Puraply 11/9, 11/23     Compression and silver collagen    /78   Pulse 96   Temp 98 °F (36.7 °C) (Tympanic)   Resp 20   Ht 5' 2\" (1.575 m)   Wt 213 lb (96.6 kg)   BMI 38.96 kg/m²         The wound has improved markedly in the past 2 weeks. It is about 1/2 the size it was. It looks fairly clean    Procedure note:  Mary Anupam wound(s) debrided. Devitalized, necrotic, fibrinous material and biofilm was removed. Hemostasis by direct pressure as needed. Instruments used:   Curette: Yes   Forceps and scissors:  No   Scalpel: No   Tissue nippers or rongeur: No  Additional injected local anesthetic: No  Tissue obtained for culture: No  Additional hemostatic agents used:   Silver Nitrate: No   Electrocautery: No   Sutures: No   Topical agents (gel foam, thrombin, Surgicel): No  Depth of Debridement - subcutaneous  Debridement Size:  Roughly 2.4 cm2    Additionally Puraply was place over the wound, about half of a 4x2 cm piece cover the wound well. Secured in place with wound veil and steri-strips. IMP/PLAN  1) Doing very well. Substantial improvement over past 2 weeks  2) Continue compression and collagen.   3) Dressing change next week and follow up with in 2 weeks

## 2021-12-14 ENCOUNTER — HOSPITAL ENCOUNTER (OUTPATIENT)
Dept: WOUND CARE | Age: 71
Discharge: HOME | End: 2021-12-15
Payer: MEDICARE

## 2021-12-14 VITALS
HEART RATE: 80 BPM | WEIGHT: 211 LBS | BODY MASS INDEX: 38.83 KG/M2 | SYSTOLIC BLOOD PRESSURE: 134 MMHG | DIASTOLIC BLOOD PRESSURE: 72 MMHG | TEMPERATURE: 96.8 F | HEIGHT: 62 IN | RESPIRATION RATE: 18 BRPM

## 2021-12-14 DIAGNOSIS — L97.319 VENOUS STASIS ULCER OF ANKLE, RIGHT (HCC): Primary | ICD-10-CM

## 2021-12-14 DIAGNOSIS — I83.013 VENOUS STASIS ULCER OF ANKLE, RIGHT (HCC): Primary | ICD-10-CM

## 2021-12-14 PROCEDURE — 99212 OFFICE O/P EST SF 10 MIN: CPT

## 2021-12-14 PROCEDURE — 29581 APPL MULTLAYER CMPRN SYS LEG: CPT

## 2021-12-14 NOTE — PROGRESS NOTES
Patient presented for dressing change per order of Dr. Jennifer Baltazar. Old dressing removed. Periwound and ulcer(s) cleansed with normal. New dressing (collagen and two-layer compression wrap) applied to right leg. Patient tolerated dressing change well. Patient has wound care appt with Dr. Jennifer Baltazar next week.          Right ankle wound

## 2021-12-21 ENCOUNTER — HOSPITAL ENCOUNTER (OUTPATIENT)
Dept: WOUND CARE | Age: 71
Discharge: HOME | End: 2021-12-22
Payer: MEDICARE

## 2021-12-21 VITALS
HEIGHT: 62 IN | WEIGHT: 214 LBS | BODY MASS INDEX: 39.38 KG/M2 | TEMPERATURE: 98.1 F | RESPIRATION RATE: 18 BRPM | SYSTOLIC BLOOD PRESSURE: 134 MMHG | DIASTOLIC BLOOD PRESSURE: 86 MMHG | HEART RATE: 99 BPM

## 2021-12-21 DIAGNOSIS — L97.319 VENOUS STASIS ULCER OF ANKLE, RIGHT (HCC): Primary | ICD-10-CM

## 2021-12-21 DIAGNOSIS — I83.013 VENOUS STASIS ULCER OF ANKLE, RIGHT (HCC): Primary | ICD-10-CM

## 2021-12-21 PROCEDURE — 86403 PARTICLE AGGLUT ANTBDY SCRN: CPT

## 2021-12-21 PROCEDURE — 87186 SC STD MICRODIL/AGAR DIL: CPT

## 2021-12-21 PROCEDURE — 29581 APPL MULTLAYER CMPRN SYS LEG: CPT

## 2021-12-21 PROCEDURE — 87205 SMEAR GRAM STAIN: CPT

## 2021-12-21 PROCEDURE — 99212 OFFICE O/P EST SF 10 MIN: CPT

## 2021-12-21 PROCEDURE — 87075 CULTR BACTERIA EXCEPT BLOOD: CPT

## 2021-12-21 PROCEDURE — 87070 CULTURE OTHR SPECIMN AEROBIC: CPT

## 2021-12-21 PROCEDURE — 87176 TISSUE HOMOGENIZATION CULTR: CPT

## 2021-12-21 RX ORDER — LIDOCAINE HYDROCHLORIDE 20 MG/ML
JELLY TOPICAL ONCE
Status: CANCELLED | OUTPATIENT
Start: 2021-12-21 | End: 2021-12-21

## 2021-12-21 RX ORDER — BACITRACIN ZINC AND POLYMYXIN B SULFATE 500; 1000 [USP'U]/G; [USP'U]/G
OINTMENT TOPICAL ONCE
Status: CANCELLED | OUTPATIENT
Start: 2021-12-21 | End: 2021-12-21

## 2021-12-21 RX ORDER — CLOBETASOL PROPIONATE 0.5 MG/G
OINTMENT TOPICAL ONCE
Status: CANCELLED | OUTPATIENT
Start: 2021-12-21 | End: 2021-12-21

## 2021-12-21 RX ORDER — LIDOCAINE 40 MG/G
CREAM TOPICAL ONCE
Status: CANCELLED | OUTPATIENT
Start: 2021-12-21 | End: 2021-12-21

## 2021-12-21 RX ORDER — LIDOCAINE HYDROCHLORIDE 40 MG/ML
SOLUTION TOPICAL ONCE
Status: CANCELLED | OUTPATIENT
Start: 2021-12-21 | End: 2021-12-21

## 2021-12-21 RX ORDER — LIDOCAINE 50 MG/G
OINTMENT TOPICAL ONCE
Status: CANCELLED | OUTPATIENT
Start: 2021-12-21 | End: 2021-12-21

## 2021-12-21 RX ORDER — GENTAMICIN SULFATE 1 MG/G
OINTMENT TOPICAL ONCE
Status: CANCELLED | OUTPATIENT
Start: 2021-12-21 | End: 2021-12-21

## 2021-12-21 RX ORDER — BETAMETHASONE DIPROPIONATE 0.05 %
OINTMENT (GRAM) TOPICAL ONCE
Status: CANCELLED | OUTPATIENT
Start: 2021-12-21 | End: 2021-12-21

## 2021-12-21 RX ORDER — BACITRACIN, NEOMYCIN, POLYMYXIN B 400; 3.5; 5 [USP'U]/G; MG/G; [USP'U]/G
OINTMENT TOPICAL ONCE
Status: CANCELLED | OUTPATIENT
Start: 2021-12-21 | End: 2021-12-21

## 2021-12-21 RX ORDER — GINSENG 100 MG
CAPSULE ORAL ONCE
Status: CANCELLED | OUTPATIENT
Start: 2021-12-21 | End: 2021-12-21

## 2021-12-21 ASSESSMENT — PAIN SCALES - GENERAL: PAINLEVEL_OUTOF10: 2

## 2021-12-21 NOTE — PROGRESS NOTES
Referred by Dr. Marianne Kelsey for management of right foot/ankle wound.  Not clear how long this wound has been present but she has had issues on and off for a long time.  For quite a while she was being managed by vascular team in [facility] but apparently now lives in [facility] and is switching her care to local doctors. Christian Moss has had quite a few vascular procedures in the past including vein ablation and injections. Jhonny Hua first visit with us was 10/26/2     Puraply 11/9, 11/23, 12/7     Compression and silver collagen    Has been having increased drainage over the past 2 weeks. She states she has been trying to keep her foot/leg elevated. /86   Pulse 99   Temp 98.1 °F (36.7 °C) (Tympanic)   Resp 18   Ht 5' 2\" (1.575 m)   Wt 214 lb (97.1 kg)   BMI 39.14 kg/m²         Black eschar, likely from Puraply. Wound measure larger. He edema seems well controlled. The wound and surrounding tissue are mildly tender. She has a little lizette-wound maceration. Procedure note:  Juvenal Russell wound(s) debrided. Devitalized, necrotic, fibrinous material and biofilm was removed. Hemostasis by direct pressure as needed. Instruments used:   Curette: Yes   Forceps and scissors:  No   Scalpel: No   Tissue nippers or rongeur: No  Additional injected local anesthetic: No  Tissue obtained for culture: Yes  Additional hemostatic agents used:   Silver Nitrate: No   Electrocautery: No   Sutures: No   Topical agents (gel foam, thrombin, Surgicel): No  Depth of Debridement - subcutaneous  Debridement Size:  Roughly 3.8 cm2    IMP/PLAN  1) Wound is a little larger  2) Emphasized the importance of leg elevation  3) Tissue culture taken  3) Try hydrofera blue along with Optilock and Compression  4) Dressing change next week. Follow up with me in 2 weeks.

## 2021-12-27 ENCOUNTER — TELEPHONE (OUTPATIENT)
Dept: WOUND CARE | Age: 71
End: 2021-12-27

## 2021-12-27 RX ORDER — DOXYCYCLINE HYCLATE 100 MG
100 TABLET ORAL 2 TIMES DAILY
Qty: 20 TABLET | Refills: 0 | Status: SHIPPED | OUTPATIENT
Start: 2021-12-27 | End: 2022-01-06

## 2021-12-27 NOTE — TELEPHONE ENCOUNTER
Dr. Tana Miller cultured patient right ankle wound last week. Results are in and Dr. Tana Miller is out of office this week. Dr. Tana Miller verbalized last week if culture wasn't done last week to see if other providers are willing to write appropriate antibiotic if needed. See results.

## 2021-12-28 ENCOUNTER — HOSPITAL ENCOUNTER (OUTPATIENT)
Dept: WOUND CARE | Age: 71
Discharge: HOME | End: 2021-12-29
Payer: MEDICARE

## 2021-12-28 VITALS
TEMPERATURE: 98 F | DIASTOLIC BLOOD PRESSURE: 78 MMHG | RESPIRATION RATE: 18 BRPM | WEIGHT: 212 LBS | HEIGHT: 62 IN | SYSTOLIC BLOOD PRESSURE: 132 MMHG | BODY MASS INDEX: 39.01 KG/M2 | HEART RATE: 88 BPM

## 2021-12-28 DIAGNOSIS — I83.013 VENOUS STASIS ULCER OF ANKLE, RIGHT (HCC): Primary | ICD-10-CM

## 2021-12-28 DIAGNOSIS — L97.319 VENOUS STASIS ULCER OF ANKLE, RIGHT (HCC): Primary | ICD-10-CM

## 2021-12-28 PROCEDURE — 99213 OFFICE O/P EST LOW 20 MIN: CPT

## 2021-12-28 PROCEDURE — 29581 APPL MULTLAYER CMPRN SYS LEG: CPT

## 2021-12-28 NOTE — TELEPHONE ENCOUNTER
I wrote for doxycylcine and sent to her pharmacy. But we will have to let who is monitoring her coumadin to be alert as it can affect the level. They may want to check it more often.   Not sure if she uses coumadin clinic

## 2022-01-04 ENCOUNTER — HOSPITAL ENCOUNTER (OUTPATIENT)
Dept: WOUND CARE | Age: 72
Discharge: HOME | End: 2022-01-05
Payer: MEDICARE

## 2022-01-04 VITALS
SYSTOLIC BLOOD PRESSURE: 136 MMHG | TEMPERATURE: 97.5 F | HEART RATE: 84 BPM | WEIGHT: 214 LBS | BODY MASS INDEX: 39.38 KG/M2 | RESPIRATION RATE: 18 BRPM | HEIGHT: 62 IN | DIASTOLIC BLOOD PRESSURE: 80 MMHG

## 2022-01-04 DIAGNOSIS — L97.319 VENOUS STASIS ULCER OF ANKLE, RIGHT (HCC): Primary | ICD-10-CM

## 2022-01-04 DIAGNOSIS — I83.013 VENOUS STASIS ULCER OF ANKLE, RIGHT (HCC): Primary | ICD-10-CM

## 2022-01-04 PROCEDURE — 15271 SKIN SUB GRAFT TRNK/ARM/LEG: CPT | Performed by: SURGERY

## 2022-01-04 PROCEDURE — 15275 SKIN SUB GRAFT FACE/NK/HF/G: CPT | Performed by: SURGERY

## 2022-01-04 PROCEDURE — 99213 OFFICE O/P EST LOW 20 MIN: CPT

## 2022-01-04 PROCEDURE — 11042 DBRDMT SUBQ TIS 1ST 20SQCM/<: CPT | Performed by: SURGERY

## 2022-01-04 PROCEDURE — 15002 WOUND PREP TRK/ARM/LEG: CPT | Performed by: SURGERY

## 2022-01-04 RX ORDER — LIDOCAINE HYDROCHLORIDE 40 MG/ML
SOLUTION TOPICAL ONCE
Status: CANCELLED | OUTPATIENT
Start: 2022-01-04 | End: 2022-01-04

## 2022-01-04 RX ORDER — GINSENG 100 MG
CAPSULE ORAL ONCE
Status: CANCELLED | OUTPATIENT
Start: 2022-01-04 | End: 2022-01-04

## 2022-01-04 RX ORDER — LIDOCAINE HYDROCHLORIDE 20 MG/ML
JELLY TOPICAL ONCE
Status: CANCELLED | OUTPATIENT
Start: 2022-01-04 | End: 2022-01-04

## 2022-01-04 RX ORDER — BACITRACIN ZINC AND POLYMYXIN B SULFATE 500; 1000 [USP'U]/G; [USP'U]/G
OINTMENT TOPICAL ONCE
Status: CANCELLED | OUTPATIENT
Start: 2022-01-04 | End: 2022-01-04

## 2022-01-04 RX ORDER — LIDOCAINE 40 MG/G
CREAM TOPICAL ONCE
Status: CANCELLED | OUTPATIENT
Start: 2022-01-04 | End: 2022-01-04

## 2022-01-04 RX ORDER — BACITRACIN, NEOMYCIN, POLYMYXIN B 400; 3.5; 5 [USP'U]/G; MG/G; [USP'U]/G
OINTMENT TOPICAL ONCE
Status: CANCELLED | OUTPATIENT
Start: 2022-01-04 | End: 2022-01-04

## 2022-01-04 RX ORDER — CLOBETASOL PROPIONATE 0.5 MG/G
OINTMENT TOPICAL ONCE
Status: CANCELLED | OUTPATIENT
Start: 2022-01-04 | End: 2022-01-04

## 2022-01-04 RX ORDER — LIDOCAINE 50 MG/G
OINTMENT TOPICAL ONCE
Status: CANCELLED | OUTPATIENT
Start: 2022-01-04 | End: 2022-01-04

## 2022-01-04 RX ORDER — BETAMETHASONE DIPROPIONATE 0.05 %
OINTMENT (GRAM) TOPICAL ONCE
Status: CANCELLED | OUTPATIENT
Start: 2022-01-04 | End: 2022-01-04

## 2022-01-04 RX ORDER — GENTAMICIN SULFATE 1 MG/G
OINTMENT TOPICAL ONCE
Status: CANCELLED | OUTPATIENT
Start: 2022-01-04 | End: 2022-01-04

## 2022-01-04 ASSESSMENT — PAIN SCALES - GENERAL: PAINLEVEL_OUTOF10: 2

## 2022-01-04 NOTE — PROGRESS NOTES
PuraPly AMTreatment Note    NAME:  Bari Dodd  YOB: 1950  MEDICAL RECORD NUMBER:  [id number]  DATE:  1/4/2022    Goal:  Patient will receive safe and proper application of skin substitute. Patient will comply with caring for dressing, and reporting complications. Expiration date checked immediately prior to use. Package intact prior to use and no damage noted. Transport temperature controlled and acceptable. PuraPly AM was removed from protective sterile packaging by provider and applied to prepared ulcer bed. PuraPly AM was hydrated with sterile normal saline per provider. PuraPly AM was applied to right ankle and affixed with steri-strips by the provider. PuraPly AM was covered with non-adherent ulcer dressing. Applied hydrafera blue adn TLC wrap over non-adherent. Applied dry gauze and/or roll gauze. Patient/caregiver was instructed not to remove dressing and to keep it clean and dry. Pt/family/caregiver was instructed on signs and symptoms of complications to report such as draining through dressing, dressing falling down/slipping, getting wet, or severe pain or tingling. PuraPly AM may be applied a total of 10 times per wound over a 12 week period. Date of first application of PuraPly for this current wound is November 9, 2021.     Guidelines followed    [id number] 4/10    Electronically signed by Pardeep Read RN on 1/4/2022 at 9:05 AM

## 2022-01-04 NOTE — PROGRESS NOTES
Referred by Dr. Bill Garza for management of right foot/ankle wound.  Not clear how long this wound has been present but she has had issues on and off for a long time.  For quite a while she was being managed by vascular team in [facility] but apparently now lives in [facility] and is switching her care to local doctors. Maryjane Jonas has had quite a few vascular procedures in the past including vein ablation and injections. NICO MARIE [facility] first visit with us was 10/26/2     Puraply 11/9, 11/23, 12/7    Culture from 12/14/2021 show Staph Aureus. Started on doxycycline. She has a few days left. Compression and hydrofera blue. She is doing better keeping it elevated. /80   Pulse 84   Temp 97.5 °F (36.4 °C) (Tympanic)   Resp 18   Ht 5' 2\" (1.575 m)   Wt 214 lb (97.1 kg)   BMI 39.14 kg/m²         Wound measure smaller, and looks considerable better than 2 weeks ago. New skin can be seen at the edges and healthy granulation within. The dark red around the wound has resolved    Procedure note:  Ronel Basalt wound(s) debrided. Devitalized, necrotic, fibrinous material and biofilm was removed. Hemostasis by direct pressure as needed. Instruments used:   Curette: Yes   Forceps and scissors:  No   Scalpel: No   Tissue nippers or rongeur: No  Additional injected local anesthetic: No  Tissue obtained for culture: No  Additional hemostatic agents used:   Silver Nitrate: No   Electrocautery: No   Sutures: No   Topical agents (gel foam, thrombin, Surgicel): No  Depth of Debridement - subcutaneous  Debridement Size:  Roughly 2.8 cm2    Puraply was also placed today. 4 x 2 cm folded into the wound. Secured in place with with wound veil and steri-strips. IMP/PLAN  1) Improving  2) Complete antibiotics  3) Dressing change in 1 week. Sooner as needed  4) Follow up with me in 2 weeks. May place Appligraft.

## 2022-01-11 ENCOUNTER — HOSPITAL ENCOUNTER (OUTPATIENT)
Dept: WOUND CARE | Age: 72
Discharge: HOME | End: 2022-01-12
Payer: MEDICARE

## 2022-01-11 VITALS
RESPIRATION RATE: 18 BRPM | WEIGHT: 215 LBS | TEMPERATURE: 98 F | HEIGHT: 62 IN | BODY MASS INDEX: 39.56 KG/M2 | DIASTOLIC BLOOD PRESSURE: 80 MMHG | SYSTOLIC BLOOD PRESSURE: 118 MMHG | HEART RATE: 80 BPM

## 2022-01-11 DIAGNOSIS — L97.319 VENOUS STASIS ULCER OF ANKLE, RIGHT (HCC): Primary | ICD-10-CM

## 2022-01-11 DIAGNOSIS — I83.013 VENOUS STASIS ULCER OF ANKLE, RIGHT (HCC): Primary | ICD-10-CM

## 2022-01-11 PROCEDURE — 99213 OFFICE O/P EST LOW 20 MIN: CPT

## 2022-01-11 PROCEDURE — 29581 APPL MULTLAYER CMPRN SYS LEG: CPT

## 2022-01-18 ENCOUNTER — HOSPITAL ENCOUNTER (OUTPATIENT)
Dept: WOUND CARE | Age: 72
Discharge: HOME | End: 2022-01-19
Payer: MEDICARE

## 2022-01-18 VITALS
HEIGHT: 62 IN | BODY MASS INDEX: 39.56 KG/M2 | HEART RATE: 95 BPM | RESPIRATION RATE: 18 BRPM | WEIGHT: 215 LBS | TEMPERATURE: 98.4 F | DIASTOLIC BLOOD PRESSURE: 90 MMHG | SYSTOLIC BLOOD PRESSURE: 136 MMHG

## 2022-01-18 DIAGNOSIS — I83.013 VENOUS STASIS ULCER OF ANKLE, RIGHT (HCC): Primary | ICD-10-CM

## 2022-01-18 DIAGNOSIS — L97.319 VENOUS STASIS ULCER OF ANKLE, RIGHT (HCC): Primary | ICD-10-CM

## 2022-01-18 PROCEDURE — 11042 DBRDMT SUBQ TIS 1ST 20SQCM/<: CPT | Performed by: SURGERY

## 2022-01-18 PROCEDURE — 29581 APPL MULTLAYER CMPRN SYS LEG: CPT

## 2022-01-18 PROCEDURE — 99212 OFFICE O/P EST SF 10 MIN: CPT

## 2022-01-18 PROCEDURE — 15271 SKIN SUB GRAFT TRNK/ARM/LEG: CPT | Performed by: SURGERY

## 2022-01-18 RX ORDER — GENTAMICIN SULFATE 1 MG/G
OINTMENT TOPICAL ONCE
Status: CANCELLED | OUTPATIENT
Start: 2022-01-18 | End: 2022-01-18

## 2022-01-18 RX ORDER — LIDOCAINE HYDROCHLORIDE 20 MG/ML
JELLY TOPICAL ONCE
Status: CANCELLED | OUTPATIENT
Start: 2022-01-18 | End: 2022-01-18

## 2022-01-18 RX ORDER — BETAMETHASONE DIPROPIONATE 0.05 %
OINTMENT (GRAM) TOPICAL ONCE
Status: CANCELLED | OUTPATIENT
Start: 2022-01-18 | End: 2022-01-18

## 2022-01-18 RX ORDER — CLOBETASOL PROPIONATE 0.5 MG/G
OINTMENT TOPICAL ONCE
Status: CANCELLED | OUTPATIENT
Start: 2022-01-18 | End: 2022-01-18

## 2022-01-18 RX ORDER — LIDOCAINE 50 MG/G
OINTMENT TOPICAL ONCE
Status: CANCELLED | OUTPATIENT
Start: 2022-01-18 | End: 2022-01-18

## 2022-01-18 RX ORDER — GINSENG 100 MG
CAPSULE ORAL ONCE
Status: CANCELLED | OUTPATIENT
Start: 2022-01-18 | End: 2022-01-18

## 2022-01-18 RX ORDER — BACITRACIN, NEOMYCIN, POLYMYXIN B 400; 3.5; 5 [USP'U]/G; MG/G; [USP'U]/G
OINTMENT TOPICAL ONCE
Status: CANCELLED | OUTPATIENT
Start: 2022-01-18 | End: 2022-01-18

## 2022-01-18 RX ORDER — LIDOCAINE HYDROCHLORIDE 40 MG/ML
SOLUTION TOPICAL ONCE
Status: CANCELLED | OUTPATIENT
Start: 2022-01-18 | End: 2022-01-18

## 2022-01-18 RX ORDER — BACITRACIN ZINC AND POLYMYXIN B SULFATE 500; 1000 [USP'U]/G; [USP'U]/G
OINTMENT TOPICAL ONCE
Status: CANCELLED | OUTPATIENT
Start: 2022-01-18 | End: 2022-01-18

## 2022-01-18 RX ORDER — LIDOCAINE 40 MG/G
CREAM TOPICAL ONCE
Status: CANCELLED | OUTPATIENT
Start: 2022-01-18 | End: 2022-01-18

## 2022-01-18 ASSESSMENT — PAIN SCALES - GENERAL: PAINLEVEL_OUTOF10: 0

## 2022-01-18 NOTE — PROGRESS NOTES
Referred by Dr. Yakelin Lopez for management of right foot/ankle wound.  Her first visit with us was 10/26/2. Not clear how long this wound has been present but she has had issues on and off for a long time.  For quite a while she was being managed by vascular team in [facility] but apparently now lives in [facility] and is switching her care to local doctors. Fortino Marc has had quite a few vascular procedures in the past including vein ablation and injections.       Puraply 11/9, 11/23, 12/7, 1/4/2022     Dressing was change by nursing staff.     Compression and hydrofera blue.     She is doing better keeping it elevated. No complaints. BP (!) 136/90   Pulse 95   Temp 98.4 °F (36.9 °C) (Tympanic)   Resp 18   Ht 5' 2\" (1.575 m)   Wt 215 lb (97.5 kg)   BMI 39.32 kg/m²         The wound is much smaller than 2 weeks about. Less 1/3 the size it was 2 weeks ago. There was some dry tan crust around the edges. Her edema is well controlled with her compression dressing. Procedure note:  Ulis Estelle wound(s) debrided. Devitalized, necrotic, fibrinous material and biofilm was removed. Hemostasis by direct pressure as needed. Instruments used:   Curette: Yes   Forceps and scissors:  No   Scalpel: No   Tissue nippers or rongeur: No  Additional injected local anesthetic: No  Tissue obtained for culture: No  Additional hemostatic agents used:   Silver Nitrate: No   Electrocautery: No   Sutures: No   Topical agents (gel foam, thrombin, Surgicel): No  Depth of Debridement - subcutaneous  Debridement Size:  Roughly 0.7 cm2        This is by far the best the wound has looked. Considered Puraply again, but we have Appligraft. I suspect the Appligraft will finish the would healing quickly. Procedure - After the wound was debrided, the Appligraft was placed of the wound. Used less then 1/4 of the graft, and doubled it in the wound. Secured in place with Wound Veil and strip strips.   Hydrofera blue and 2 layer compression applied. IMP/PLAN  1) Follow up in 1 week for nurse visit dressing change  2) I will see her back in 2 weeks.

## 2022-01-25 ENCOUNTER — HOSPITAL ENCOUNTER (OUTPATIENT)
Dept: WOUND CARE | Age: 72
Discharge: HOME | End: 2022-01-26
Payer: MEDICARE

## 2022-01-25 VITALS
TEMPERATURE: 98.5 F | DIASTOLIC BLOOD PRESSURE: 78 MMHG | HEIGHT: 62 IN | BODY MASS INDEX: 39.56 KG/M2 | WEIGHT: 215 LBS | SYSTOLIC BLOOD PRESSURE: 118 MMHG | HEART RATE: 88 BPM | RESPIRATION RATE: 18 BRPM

## 2022-01-25 DIAGNOSIS — I83.013 VENOUS STASIS ULCER OF ANKLE, RIGHT (HCC): Primary | ICD-10-CM

## 2022-01-25 DIAGNOSIS — L97.319 VENOUS STASIS ULCER OF ANKLE, RIGHT (HCC): Primary | ICD-10-CM

## 2022-01-25 PROCEDURE — 29581 APPL MULTLAYER CMPRN SYS LEG: CPT

## 2022-01-25 PROCEDURE — 99212 OFFICE O/P EST SF 10 MIN: CPT

## 2022-01-25 NOTE — PLAN OF CARE
Problem: Wound:  Goal: Will show signs of wound healing; wound closure and no evidence of infection  Description: Will show signs of wound healing; wound closure and no evidence of infection  Outcome: Ongoing     Problem: Venous:  Goal: Signs of wound healing will improve  Description: Signs of wound healing will improve  Outcome: Ongoing     Problem: Weight control:  Goal: Ability to maintain an optimal weight for height and age will be supported  Description: Ability to maintain an optimal weight for height and age will be supported  Outcome: Ongoing     Problem: Falls - Risk of:  Goal: Will remain free from falls  Description: Will remain free from falls  Outcome: Ongoing

## 2022-01-25 NOTE — PROGRESS NOTES
Patient presented for dressing change per order of Dr. Huang Molina. Old dressing removed. Periwound and ulcer(s) cleansed with normal saline. New dressing (hydraferablue and two layer compression wrap) applied to right leg. See flow sheet for measurements. Pt tolerated dressing change well. Pt has appt with Dr. Huang Molina 2/1/22. Right ankle wound.

## 2022-02-01 ENCOUNTER — HOSPITAL ENCOUNTER (OUTPATIENT)
Dept: WOUND CARE | Age: 72
Discharge: HOME | End: 2022-02-02
Payer: MEDICARE

## 2022-02-01 VITALS
HEIGHT: 62 IN | SYSTOLIC BLOOD PRESSURE: 132 MMHG | HEART RATE: 88 BPM | BODY MASS INDEX: 39.93 KG/M2 | DIASTOLIC BLOOD PRESSURE: 88 MMHG | RESPIRATION RATE: 20 BRPM | TEMPERATURE: 98 F | WEIGHT: 217 LBS

## 2022-02-01 DIAGNOSIS — L97.319 VENOUS STASIS ULCER OF ANKLE, RIGHT (HCC): Primary | ICD-10-CM

## 2022-02-01 DIAGNOSIS — I83.013 VENOUS STASIS ULCER OF ANKLE, RIGHT (HCC): Primary | ICD-10-CM

## 2022-02-01 PROCEDURE — 87075 CULTR BACTERIA EXCEPT BLOOD: CPT

## 2022-02-01 PROCEDURE — 86403 PARTICLE AGGLUT ANTBDY SCRN: CPT

## 2022-02-01 PROCEDURE — 87176 TISSUE HOMOGENIZATION CULTR: CPT

## 2022-02-01 PROCEDURE — 87186 SC STD MICRODIL/AGAR DIL: CPT

## 2022-02-01 PROCEDURE — 87205 SMEAR GRAM STAIN: CPT

## 2022-02-01 PROCEDURE — 29581 APPL MULTLAYER CMPRN SYS LEG: CPT

## 2022-02-01 PROCEDURE — 99213 OFFICE O/P EST LOW 20 MIN: CPT

## 2022-02-01 PROCEDURE — 87070 CULTURE OTHR SPECIMN AEROBIC: CPT

## 2022-02-01 RX ORDER — CLOBETASOL PROPIONATE 0.5 MG/G
OINTMENT TOPICAL ONCE
Status: CANCELLED | OUTPATIENT
Start: 2022-02-01 | End: 2022-02-01

## 2022-02-01 RX ORDER — GENTAMICIN SULFATE 1 MG/G
OINTMENT TOPICAL ONCE
Status: CANCELLED | OUTPATIENT
Start: 2022-02-01 | End: 2022-02-01

## 2022-02-01 RX ORDER — LIDOCAINE HYDROCHLORIDE 20 MG/ML
JELLY TOPICAL ONCE
Status: CANCELLED | OUTPATIENT
Start: 2022-02-01 | End: 2022-02-01

## 2022-02-01 RX ORDER — LIDOCAINE HYDROCHLORIDE 40 MG/ML
SOLUTION TOPICAL ONCE
Status: CANCELLED | OUTPATIENT
Start: 2022-02-01 | End: 2022-02-01

## 2022-02-01 RX ORDER — LIDOCAINE 40 MG/G
CREAM TOPICAL ONCE
Status: CANCELLED | OUTPATIENT
Start: 2022-02-01 | End: 2022-02-01

## 2022-02-01 RX ORDER — BETAMETHASONE DIPROPIONATE 0.05 %
OINTMENT (GRAM) TOPICAL ONCE
Status: CANCELLED | OUTPATIENT
Start: 2022-02-01 | End: 2022-02-01

## 2022-02-01 RX ORDER — LIDOCAINE 50 MG/G
OINTMENT TOPICAL ONCE
Status: CANCELLED | OUTPATIENT
Start: 2022-02-01 | End: 2022-02-01

## 2022-02-01 RX ORDER — GINSENG 100 MG
CAPSULE ORAL ONCE
Status: CANCELLED | OUTPATIENT
Start: 2022-02-01 | End: 2022-02-01

## 2022-02-01 RX ORDER — BACITRACIN ZINC AND POLYMYXIN B SULFATE 500; 1000 [USP'U]/G; [USP'U]/G
OINTMENT TOPICAL ONCE
Status: CANCELLED | OUTPATIENT
Start: 2022-02-01 | End: 2022-02-01

## 2022-02-01 RX ORDER — BACITRACIN, NEOMYCIN, POLYMYXIN B 400; 3.5; 5 [USP'U]/G; MG/G; [USP'U]/G
OINTMENT TOPICAL ONCE
Status: CANCELLED | OUTPATIENT
Start: 2022-02-01 | End: 2022-02-01

## 2022-02-01 ASSESSMENT — PAIN SCALES - GENERAL: PAINLEVEL_OUTOF10: 0

## 2022-02-01 NOTE — PROGRESS NOTES
Referred by Dr. Mary Melissa for management of right foot/ankle wound.  Her first visit with us was 10/26/2. Not clear how long this wound has been present but she has had issues on and off for a long time.  For quite a while she was being managed by vascular team in [facility] but apparently now lives in [facility] and is switching her care to local doctors. Myriam Fisher has had quite a few vascular procedures in the past including vein ablation and injections.       Puraply 11/9, 11/23, 12/7, 1/4/2022    Appligraft 1/18/22     Dressing was change by nursing staff last week     Compression and hydrofera blue.     /88   Pulse 88   Temp 98 °F (36.7 °C) (Tympanic)   Resp 20   Ht 5' 2\" (1.575 m)   Wt 217 lb (98.4 kg)   BMI 39.69 kg/m²         Wound has improved a little, but not much in the past 2 - 3 weeks. Procedure note:  Lanny Dubois wound(s) debrided. Devitalized, necrotic, fibrinous material and biofilm was removed. Hemostasis by direct pressure as needed. Instruments used:   Curette: Yes   Forceps and scissors:  No   Scalpel: No   Tissue nippers or rongeur: No  Additional injected local anesthetic: No  Tissue obtained for culture: Yes  Additional hemostatic agents used:   Silver Nitrate: No   Electrocautery: No   Sutures: No   Topical agents (gel foam, thrombin, Surgicel): No  Depth of Debridement - subcutaneous  Debridement Size:  Roughly 0.6 cm2    IMP/PLAN  1) Venous Stasis Ulcer - stalled.   - Culture taken  - Continue compression and hydrofera  - Recheck in 1 week

## 2022-02-02 RX ORDER — CEPHALEXIN 500 MG/1
1000 CAPSULE ORAL 2 TIMES DAILY
Qty: 40 CAPSULE | Refills: 0 | Status: SHIPPED | OUTPATIENT
Start: 2022-02-02 | End: 2022-02-12

## 2022-02-08 ENCOUNTER — HOSPITAL ENCOUNTER (OUTPATIENT)
Dept: WOUND CARE | Age: 72
Discharge: HOME | End: 2022-02-09
Payer: MEDICARE

## 2022-02-08 VITALS
RESPIRATION RATE: 20 BRPM | BODY MASS INDEX: 39.75 KG/M2 | DIASTOLIC BLOOD PRESSURE: 78 MMHG | TEMPERATURE: 98.5 F | WEIGHT: 216 LBS | HEART RATE: 93 BPM | SYSTOLIC BLOOD PRESSURE: 127 MMHG | HEIGHT: 62 IN

## 2022-02-08 DIAGNOSIS — I83.013 VENOUS STASIS ULCER OF ANKLE, RIGHT (HCC): Primary | ICD-10-CM

## 2022-02-08 DIAGNOSIS — L97.319 VENOUS STASIS ULCER OF ANKLE, RIGHT (HCC): Primary | ICD-10-CM

## 2022-02-08 PROCEDURE — 99213 OFFICE O/P EST LOW 20 MIN: CPT

## 2022-02-08 PROCEDURE — 29581 APPL MULTLAYER CMPRN SYS LEG: CPT

## 2022-02-08 PROCEDURE — 29580 STRAPPING UNNA BOOT: CPT

## 2022-02-08 PROCEDURE — 11042 DBRDMT SUBQ TIS 1ST 20SQCM/<: CPT | Performed by: SURGERY

## 2022-02-08 RX ORDER — CLOBETASOL PROPIONATE 0.5 MG/G
OINTMENT TOPICAL ONCE
Status: CANCELLED | OUTPATIENT
Start: 2022-02-08 | End: 2022-02-08

## 2022-02-08 RX ORDER — LIDOCAINE HYDROCHLORIDE 40 MG/ML
SOLUTION TOPICAL ONCE
Status: CANCELLED | OUTPATIENT
Start: 2022-02-08 | End: 2022-02-08

## 2022-02-08 RX ORDER — GENTAMICIN SULFATE 1 MG/G
OINTMENT TOPICAL ONCE
Status: CANCELLED | OUTPATIENT
Start: 2022-02-08 | End: 2022-02-08

## 2022-02-08 RX ORDER — BACITRACIN, NEOMYCIN, POLYMYXIN B 400; 3.5; 5 [USP'U]/G; MG/G; [USP'U]/G
OINTMENT TOPICAL ONCE
Status: CANCELLED | OUTPATIENT
Start: 2022-02-08 | End: 2022-02-08

## 2022-02-08 RX ORDER — LIDOCAINE HYDROCHLORIDE 20 MG/ML
JELLY TOPICAL ONCE
Status: CANCELLED | OUTPATIENT
Start: 2022-02-08 | End: 2022-02-08

## 2022-02-08 RX ORDER — LIDOCAINE 40 MG/G
CREAM TOPICAL ONCE
Status: CANCELLED | OUTPATIENT
Start: 2022-02-08 | End: 2022-02-08

## 2022-02-08 RX ORDER — GINSENG 100 MG
CAPSULE ORAL ONCE
Status: CANCELLED | OUTPATIENT
Start: 2022-02-08 | End: 2022-02-08

## 2022-02-08 RX ORDER — LIDOCAINE 50 MG/G
OINTMENT TOPICAL ONCE
Status: CANCELLED | OUTPATIENT
Start: 2022-02-08 | End: 2022-02-08

## 2022-02-08 RX ORDER — BACITRACIN ZINC AND POLYMYXIN B SULFATE 500; 1000 [USP'U]/G; [USP'U]/G
OINTMENT TOPICAL ONCE
Status: CANCELLED | OUTPATIENT
Start: 2022-02-08 | End: 2022-02-08

## 2022-02-08 RX ORDER — BETAMETHASONE DIPROPIONATE 0.05 %
OINTMENT (GRAM) TOPICAL ONCE
Status: CANCELLED | OUTPATIENT
Start: 2022-02-08 | End: 2022-02-08

## 2022-02-08 ASSESSMENT — PAIN SCALES - GENERAL: PAINLEVEL_OUTOF10: 0

## 2022-02-08 NOTE — PROGRESS NOTES
Referred by Dr. Cynthia Calderon for management of right foot/ankle wound.  Her first visit with us was 10/26/2. Not clear how long this wound has been present but she has had issues on and off for a long time.  For quite a while she was being managed by vascular team in [facility] but apparently now lives in [facility] and is switching her care to local doctors. Waqas London has had quite a few vascular procedures in the past including vein ablation and injections.       Puraply 11/9, 11/23, 12/7, 1/4/2022     Appligraft 1/18/22     Dressing was change by nursing staff last week     Compression and hydrofera blue. Culture positive MSSA last week. Started on cephalexin. /78   Pulse 93   Temp 98.5 °F (36.9 °C) (Tympanic)   Resp 20   Ht 5' 2\" (1.575 m)   Wt 216 lb (98 kg)   BMI 39.51 kg/m²         A little yellow fibrinous exudate in the base, but the wound is 30 - 40 % small this week. Procedure note:  [facility] wound(s) debrided. Devitalized, necrotic, fibrinous material and biofilm was removed. Hemostasis by direct pressure as needed. Instruments used:   Curette: Yes   Forceps and scissors:  No   Scalpel: No   Tissue nippers or rongeur: No  Additional injected local anesthetic: No  Tissue obtained for culture: No  Additional hemostatic agents used:   Silver Nitrate: No   Electrocautery: No   Sutures: No   Topical agents (gel foam, thrombin, Surgicel): No  Depth of Debridement - subcutaneous  Debridement Size:  Roughly 0.6 cm2    IMP/PLAN  1) Improving  2) Complete antibiotics  3) Continue compression and hydrofera.

## 2022-02-15 ENCOUNTER — HOSPITAL ENCOUNTER (OUTPATIENT)
Dept: WOUND CARE | Age: 72
Discharge: HOME | End: 2022-02-16
Payer: MEDICARE

## 2022-02-15 VITALS
WEIGHT: 214 LBS | SYSTOLIC BLOOD PRESSURE: 128 MMHG | RESPIRATION RATE: 20 BRPM | HEIGHT: 62 IN | BODY MASS INDEX: 39.38 KG/M2 | TEMPERATURE: 98.3 F | HEART RATE: 88 BPM | DIASTOLIC BLOOD PRESSURE: 72 MMHG

## 2022-02-15 DIAGNOSIS — L97.319 VENOUS STASIS ULCER OF ANKLE, RIGHT (HCC): Primary | ICD-10-CM

## 2022-02-15 DIAGNOSIS — I83.013 VENOUS STASIS ULCER OF ANKLE, RIGHT (HCC): Primary | ICD-10-CM

## 2022-02-15 PROCEDURE — 29581 APPL MULTLAYER CMPRN SYS LEG: CPT

## 2022-02-15 PROCEDURE — 99213 OFFICE O/P EST LOW 20 MIN: CPT

## 2022-02-15 NOTE — PLAN OF CARE
Problem: Wound:  Goal: Will show signs of wound healing; wound closure and no evidence of infection  Description: Will show signs of wound healing; wound closure and no evidence of infection  Outcome: Ongoing     Problem: Venous:  Goal: Signs of wound healing will improve  Description: Signs of wound healing will improve  Outcome: Ongoing     Problem: Weight control:  Goal: Ability to maintain an optimal weight for height and age will be supported  Description: Ability to maintain an optimal weight for height and age will be supported  Outcome: Ongoing     Problem: Falls - Risk of:  Goal: Will remain free from falls  Description: Will remain free from falls  Outcome: Ongoing     Problem: Blood Glucose:  Goal: Ability to maintain appropriate glucose levels will improve  Description: Ability to maintain appropriate glucose levels will improve  Outcome: Ongoing

## 2022-02-15 NOTE — PROGRESS NOTES
Patient presented for dressing change per order of Dr. Huang Molina. Old dressing removed. Periwound and ulcer(s) cleansed with normal saline. New dressing (hydroferablue and two-layer compression wrap) applied to right leg. See wound flowsheet for assessment and measurements. Pt tolerated well.            Right ankle

## 2022-02-22 ENCOUNTER — HOSPITAL ENCOUNTER (OUTPATIENT)
Dept: WOUND CARE | Age: 72
Discharge: HOME | End: 2022-02-23
Payer: MEDICARE

## 2022-02-22 VITALS
HEIGHT: 62 IN | RESPIRATION RATE: 18 BRPM | TEMPERATURE: 98.2 F | BODY MASS INDEX: 39.56 KG/M2 | HEART RATE: 80 BPM | WEIGHT: 215 LBS | DIASTOLIC BLOOD PRESSURE: 70 MMHG | SYSTOLIC BLOOD PRESSURE: 130 MMHG

## 2022-02-22 DIAGNOSIS — I83.013 VENOUS STASIS ULCER OF ANKLE, RIGHT (HCC): Primary | ICD-10-CM

## 2022-02-22 DIAGNOSIS — L97.319 VENOUS STASIS ULCER OF ANKLE, RIGHT (HCC): Primary | ICD-10-CM

## 2022-02-22 PROCEDURE — 29581 APPL MULTLAYER CMPRN SYS LEG: CPT

## 2022-02-22 PROCEDURE — 99212 OFFICE O/P EST SF 10 MIN: CPT

## 2022-02-22 NOTE — PROGRESS NOTES
Patient presented for dressing change per order of Dr. Ofe Rocha. Old dressing removed. Periwound and ulcer(s) cleansed with normal saline. See flowsheet for measurements and assessment. New dressing (hydraferablue and two-layer compression) applied to right leg. Pt tolerated dressing change well. F/u appt made 3/1/22 with Dr. Ofe Rocha.         Right ankle wound

## 2022-03-01 ENCOUNTER — HOSPITAL ENCOUNTER (OUTPATIENT)
Dept: WOUND CARE | Age: 72
Discharge: HOME | End: 2022-03-02
Payer: MEDICARE

## 2022-03-01 VITALS
TEMPERATURE: 97.9 F | HEIGHT: 62 IN | RESPIRATION RATE: 18 BRPM | DIASTOLIC BLOOD PRESSURE: 88 MMHG | BODY MASS INDEX: 39.01 KG/M2 | WEIGHT: 212 LBS | SYSTOLIC BLOOD PRESSURE: 130 MMHG | HEART RATE: 103 BPM

## 2022-03-01 DIAGNOSIS — I83.013 VENOUS STASIS ULCER OF ANKLE, RIGHT (HCC): Primary | ICD-10-CM

## 2022-03-01 DIAGNOSIS — L97.319 VENOUS STASIS ULCER OF ANKLE, RIGHT (HCC): Primary | ICD-10-CM

## 2022-03-01 PROCEDURE — 99212 OFFICE O/P EST SF 10 MIN: CPT

## 2022-03-01 PROCEDURE — 99212 OFFICE O/P EST SF 10 MIN: CPT | Performed by: SURGERY

## 2022-03-01 RX ORDER — LIDOCAINE 40 MG/G
CREAM TOPICAL ONCE
Status: CANCELLED | OUTPATIENT
Start: 2022-03-01 | End: 2022-03-01

## 2022-03-01 RX ORDER — LIDOCAINE HYDROCHLORIDE 20 MG/ML
JELLY TOPICAL ONCE
Status: CANCELLED | OUTPATIENT
Start: 2022-03-01 | End: 2022-03-01

## 2022-03-01 RX ORDER — GENTAMICIN SULFATE 1 MG/G
OINTMENT TOPICAL ONCE
Status: CANCELLED | OUTPATIENT
Start: 2022-03-01 | End: 2022-03-01

## 2022-03-01 RX ORDER — GINSENG 100 MG
CAPSULE ORAL ONCE
Status: CANCELLED | OUTPATIENT
Start: 2022-03-01 | End: 2022-03-01

## 2022-03-01 RX ORDER — BETAMETHASONE DIPROPIONATE 0.05 %
OINTMENT (GRAM) TOPICAL ONCE
Status: CANCELLED | OUTPATIENT
Start: 2022-03-01 | End: 2022-03-01

## 2022-03-01 RX ORDER — BACITRACIN ZINC AND POLYMYXIN B SULFATE 500; 1000 [USP'U]/G; [USP'U]/G
OINTMENT TOPICAL ONCE
Status: CANCELLED | OUTPATIENT
Start: 2022-03-01 | End: 2022-03-01

## 2022-03-01 RX ORDER — CLOBETASOL PROPIONATE 0.5 MG/G
OINTMENT TOPICAL ONCE
Status: CANCELLED | OUTPATIENT
Start: 2022-03-01 | End: 2022-03-01

## 2022-03-01 RX ORDER — LIDOCAINE HYDROCHLORIDE 40 MG/ML
SOLUTION TOPICAL ONCE
Status: CANCELLED | OUTPATIENT
Start: 2022-03-01 | End: 2022-03-01

## 2022-03-01 RX ORDER — LIDOCAINE 50 MG/G
OINTMENT TOPICAL ONCE
Status: CANCELLED | OUTPATIENT
Start: 2022-03-01 | End: 2022-03-01

## 2022-03-01 RX ORDER — BACITRACIN, NEOMYCIN, POLYMYXIN B 400; 3.5; 5 [USP'U]/G; MG/G; [USP'U]/G
OINTMENT TOPICAL ONCE
Status: CANCELLED | OUTPATIENT
Start: 2022-03-01 | End: 2022-03-01

## 2022-03-01 ASSESSMENT — PAIN SCALES - GENERAL: PAINLEVEL_OUTOF10: 0

## 2022-03-01 NOTE — PROGRESS NOTES
Referred by Dr. Otilia Breen for management of right foot/ankle wound.  Her first visit with us was 10/26/2. Not clear how long this wound has been present but she has had issues on and off for a long time.  For quite a while she was being managed by vascular team in [facility] but apparently now lives in [facility] and is switching her care to local doctors. Jonas Lopez has had quite a few vascular procedures in the past including vein ablation and injections.       [facility] 11/9, 11/23, 12/7, 1/4/2022     Appligraft 1/18/22     Dressing was change by nursing staff last week     Compression and hydrofera blue. No antibiotics currently. BP (!) 150/90   Pulse 103   Temp 97.9 °F (36.6 °C) (Tympanic)   Resp 18   Ht 5' 2\" (1.575 m)   Wt 212 lb (96.2 kg)   BMI 38.78 kg/m²         You can see some scale (scab). The wound is essentially healed. After scale removal        IMP/PLAN  1) Very nearly healed  - will try self-adherent foam under compression stocking  - If no problems, will discharge her from out care next week.

## 2022-03-08 ENCOUNTER — HOSPITAL ENCOUNTER (OUTPATIENT)
Dept: WOUND CARE | Age: 72
Discharge: HOME | End: 2022-03-09
Payer: MEDICARE

## 2022-03-08 VITALS — HEIGHT: 62 IN | WEIGHT: 212 LBS | BODY MASS INDEX: 39.01 KG/M2

## 2022-03-08 DIAGNOSIS — I83.013 VENOUS STASIS ULCER OF ANKLE, RIGHT (HCC): Primary | ICD-10-CM

## 2022-03-08 DIAGNOSIS — L97.319 VENOUS STASIS ULCER OF ANKLE, RIGHT (HCC): Primary | ICD-10-CM

## 2022-03-08 PROCEDURE — 99212 OFFICE O/P EST SF 10 MIN: CPT

## 2022-03-08 ASSESSMENT — PAIN SCALES - GENERAL: PAINLEVEL_OUTOF10: 0

## 2022-03-08 NOTE — PROGRESS NOTES
Patient presented for dressing change per order of Dr. Pino Madrid. Old dressing removed. Periwound and ulcer(s) cleansed. Ulcer completed healed per Dr. Pino Madrid. Patient states she received lymphedema pumps 3/5/22 and was educated on proper use. Follow up as needed, patient verbalized understanding.          Right ankle

## 2022-07-11 ENCOUNTER — TELEPHONE (OUTPATIENT)
Dept: SURGERY | Age: 72
End: 2022-07-11

## 2022-07-11 NOTE — TELEPHONE ENCOUNTER
Patient called the office asking to speak with Yasmani Puente in wound care. Patient states she was seeing Dr Yeni Lui back in March in wound care. Patient has oozing out of her right foot. Foot is not swollen, red or painful. Patient states it started over the weekend. Patient is currently elevating and covering the spot that is leaking. Please call the patient back ph#[phone].

## 2022-07-11 NOTE — TELEPHONE ENCOUNTER
Patient called clinic back stating she is draining clear drainage from right ankle. Patient is elevating, using compression stockings, and lymphedema pumps periodically 3x/week. Scheduled patient with Dr. Marilyn Burt.

## 2022-07-28 ENCOUNTER — TELEPHONE (OUTPATIENT)
Dept: SURGERY | Age: 72
End: 2022-07-28

## 2022-08-16 ENCOUNTER — HOSPITAL ENCOUNTER (OUTPATIENT)
Dept: WOUND CARE | Age: 72
Discharge: HOME | End: 2022-08-17
Payer: MEDICARE

## 2022-08-16 VITALS
WEIGHT: 209 LBS | SYSTOLIC BLOOD PRESSURE: 138 MMHG | TEMPERATURE: 98.5 F | HEART RATE: 92 BPM | RESPIRATION RATE: 18 BRPM | DIASTOLIC BLOOD PRESSURE: 86 MMHG | HEIGHT: 62 IN | BODY MASS INDEX: 38.46 KG/M2

## 2022-08-16 PROCEDURE — 99212 OFFICE O/P EST SF 10 MIN: CPT | Performed by: SURGERY

## 2022-08-16 PROCEDURE — 99212 OFFICE O/P EST SF 10 MIN: CPT

## 2022-08-16 RX ORDER — OXYBUTYNIN CHLORIDE 5 MG/1
TABLET ORAL
COMMUNITY
Start: 2022-06-21

## 2022-08-30 ENCOUNTER — HOSPITAL ENCOUNTER (OUTPATIENT)
Dept: WOUND CARE | Age: 72
Discharge: HOME | End: 2022-08-31
Payer: MEDICARE

## 2022-08-30 VITALS
WEIGHT: 207 LBS | SYSTOLIC BLOOD PRESSURE: 132 MMHG | HEIGHT: 62 IN | HEART RATE: 84 BPM | DIASTOLIC BLOOD PRESSURE: 84 MMHG | BODY MASS INDEX: 38.09 KG/M2 | RESPIRATION RATE: 18 BRPM | TEMPERATURE: 97.8 F

## 2022-08-30 DIAGNOSIS — L97.319 VENOUS STASIS ULCER OF ANKLE, RIGHT (HCC): Primary | ICD-10-CM

## 2022-08-30 DIAGNOSIS — I83.013 VENOUS STASIS ULCER OF ANKLE, RIGHT (HCC): Primary | ICD-10-CM

## 2022-08-30 DIAGNOSIS — I87.2 VENOUS STASIS DERMATITIS OF RIGHT LOWER EXTREMITY: ICD-10-CM

## 2022-08-30 PROCEDURE — 99213 OFFICE O/P EST LOW 20 MIN: CPT | Performed by: SURGERY

## 2022-08-30 PROCEDURE — 99212 OFFICE O/P EST SF 10 MIN: CPT

## 2022-08-30 ASSESSMENT — PAIN SCALES - GENERAL: PAINLEVEL_OUTOF10: 0

## 2022-08-30 NOTE — DISCHARGE INSTRUCTIONS
Discontinue previous dressing. Change hydrocolloid every other day. Remove the old dressing which is self-adhesive. Flush the wound with normal saline or in the shower if allowed. Do not scrub the wound. Pat wound and skin dry and apply new dressing. SIGNS OF INFECTION  - Redness, swelling, skin hot  - Wound bed turns black or stringy yellow  - Foul odor  - Increased drainage or pus  - Increased pain  - Fever greater than 100F    CALL YOUR DOCTOR OR SEEK MEDICAL ATTENTION IF SIGNS OF INFECTION.   DO NOT WAIT UNTIL YOUR NEXT APPOINTMENT    Call the Wound Care Nurse with any other questions or concerns- [phone]     Follow up as scheduled

## 2022-08-30 NOTE — PROGRESS NOTES
Patient we had seen previous now comes in with a relapse of her right ankle ulcer. First noticed it in July when it spontaneously drained a lot of serous fluid, soaking her stocking and shoes. First wound care visit this episode 8/16/2022. On foam dressing and compression. She has not made a podiatry appointment yet. /84   Pulse 84   Temp 97.8 °F (36.6 °C) (Tympanic)   Resp 18   Ht 5' 2\" (1.575 m)   Wt 207 lb (93.9 kg)   BMI 37.86 kg/m²         Minimal residual ulceration. Imp/Plan  1) Will switch to hydrocolloid. Drainage is minimal  2) May continue to hydrocolloid indefinitely, to protect that very thin skin. 3) Will get her an appointment with podiatry to address calluses. 4) F/U in 2 weeks.

## 2022-09-12 ENCOUNTER — OFFICE VISIT (OUTPATIENT)
Dept: PODIATRY | Age: 72
End: 2022-09-12
Payer: MEDICARE

## 2022-09-12 VITALS
HEART RATE: 82 BPM | HEIGHT: 62 IN | SYSTOLIC BLOOD PRESSURE: 132 MMHG | DIASTOLIC BLOOD PRESSURE: 78 MMHG | WEIGHT: 211 LBS | BODY MASS INDEX: 38.83 KG/M2

## 2022-09-12 DIAGNOSIS — I89.0 LYMPHEDEMA OF BOTH LOWER EXTREMITIES: ICD-10-CM

## 2022-09-12 DIAGNOSIS — M79.672 FOOT PAIN, BILATERAL: ICD-10-CM

## 2022-09-12 DIAGNOSIS — M20.40 HAMMER TOE, UNSPECIFIED LATERALITY: Primary | ICD-10-CM

## 2022-09-12 DIAGNOSIS — M79.671 FOOT PAIN, BILATERAL: ICD-10-CM

## 2022-09-12 DIAGNOSIS — L84 CORNS AND CALLOSITIES: ICD-10-CM

## 2022-09-12 PROCEDURE — 1124F ACP DISCUSS-NO DSCNMKR DOCD: CPT | Performed by: PODIATRIST

## 2022-09-12 PROCEDURE — 99203 OFFICE O/P NEW LOW 30 MIN: CPT | Performed by: PODIATRIST

## 2022-09-12 PROCEDURE — 11056 PARNG/CUTG B9 HYPRKR LES 2-4: CPT | Performed by: PODIATRIST

## 2022-09-12 NOTE — PROGRESS NOTES
Foot Care Worksheet  PCP: Tristian Uribe DO  Last visit: 5/12/2022    Nail description: Thick , Yellow , Crumbly , Marked limitation of ambulation     Pain resulting from thickened and dystrophy of nail plate No    Nails involved  Right   1, 2, 3, 4, 5  (T5-T9)  Left     1, 2, 3, 4, 5  (TA-T4)    Q7 1 Class A Finding - Non traumatic amputation of foot No    Q8 2 Class B Findings - Absent DP pulse No, Absent PT pulse No, Advanced tropic changes (3 required) Yes    Decrease hair growth Yes, Nail changes/thickening Yes, Pigmented changes/discoloration Yes, Skin texture (thin, shiny) No, Skin color (rubor/redness) No    Q9 1 Class B and 2 Class C Findings  Claudication No, Temperature change Yes, Paresthesia No, Burning No, Edema Yes    Subjective:  Jammie De Anda is a 71 y.o. female who presents to the office today complaining of a painful callous on the Bilateral foot . Symptoms began  year(s) ago. Patient relates pain is Present. Pain is rated 2 out of 10 and is described as mild. Treatments prior to today's visit include: none. Currently denies F/C/N/V. No Known Allergies    Past Medical History:   Diagnosis Date    DVT (deep venous thrombosis) (HCC)     RLE    Osteoarthritis        Prior to Admission medications    Medication Sig Start Date End Date Taking? Authorizing Provider   oxybutynin (DITROPAN) 5 MG tablet take 1 tablet by mouth every 12 hours for URINARY SYMPTOMS 6/21/22  Yes Historical Provider, MD   HYDROcodone-acetaminophen (NORCO) 5-325 MG per tablet Take 1 tablet by mouth every 6 hours as needed for Pain.    Yes Historical Provider, MD   celecoxib (CELEBREX) 200 MG capsule take 1 capsule by mouth once daily 4/30/21  Yes Historical Provider, MD   atorvastatin (LIPITOR) 20 MG tablet Take 20 mg by mouth daily   Yes Historical Provider, MD   calcium carbonate 600 MG TABS tablet Take 1 tablet by mouth daily   Yes Historical Provider, MD   vitamin D (CHOLECALCIFEROL) 25 MCG (1000 UT) TABS tablet Take 1,000 Units by mouth daily Takes 4x/day. Per pharmacy as directed   Yes Historical Provider, MD   warfarin (COUMADIN) 5 MG tablet Take 5 mg by mouth Takes 1-1.5 tabs (5-7.5 mg total) by mouth daily. Yes Historical Provider, MD       Past Surgical History:   Procedure Laterality Date    COLONOSCOPY  2012    HYSTERECTOMY, TOTAL ABDOMINAL (CERVIX REMOVED)  1999       Family History   Problem Relation Age of Onset    Diabetes Mother     Coronary Art Dis Mother     Arthritis Mother     High Blood Pressure Mother     Cancer Father        Social History     Tobacco Use    Smoking status: Never    Smokeless tobacco: Never   Substance Use Topics    Alcohol use: Not Currently     Alcohol/week: 0.0 - 1.0 standard drinks       ROS: All 14 ROS systems reviewed and pertinent positives noted above, all others negative. Lower Extremity Physical Examination:     Vitals:   Vitals:    09/12/22 1446   BP: 132/78   Pulse: 82     General: AAO x 3 in NAD. Vascular: DP and PT pulses palpable 2/4, bilateral.  CFT <3 seconds, bilateral.  Hair growth absent to the level of the digits, bilateral.  Edema present, bilateral.  Varicosities present, bilateral. Erythema absent, bilateral. Distal Rubor absent bilateral.  Temperature decreased bilateral. Hyperpigmentation present bilateral. Atrophic skin yes. Neurological: Sensation intact to light touch to level of digits, bilateral.  Protective sensation intact 10/10 sites via 5.07/10g Dear[place]-Roby Monofilament, bilateral.  negative Tinel's, bilateral.  negative Valleix sign, bilateral.  Vibratory intact bilateral.  Reflexes Decreased bilateral.  Paresthesias negative. Dysthesias negative.   Sharp/dull intact bilateral.    Musculoskeletal: Muscle strength 5/5, bilateral.  Pain present upon palpation of the callous lesion Bilateral. decreased medial longitudinal arch, Bilateral.  Ankle ROM decreased,Bilateral.  1st MPJ ROM within normal limits, Bilateral.  Dorsally contracted digits present digits , Bilateral. Other foot deformities HAV L. .    Integument: Warm, dry, supple, bilateral.  Hyperkeratotic tissue is present. Central core is present upon debridement. Callous is located sub R 1st met head and central R arch. .  Open lesion absent, Bilateral.  Interdigital maceration absent to web spaces , bilateral.  Nails left 1, 2, 3, 4, 5 and right 1, 2, 3, 4, 5 thickened, dystrophic and crumbly, discolored with subungual debris. Fissures absent, Bilateral.      Asessment: Patient is a 71 y.o. female with:    Diagnosis Orders   1. Hammer toe, unspecified laterality        2. Lymphedema of both lower extremities  TRIM BENIGN HYPERKERATOTIC SKIN LESION,2-4      3. Corns and callosities  TRIM BENIGN HYPERKERATOTIC SKIN LESION,2-4      4. Foot pain, bilateral  TRIM BENIGN HYPERKERATOTIC SKIN LESION,2-4          Plan: Patient examined and evaluated. Current condition and treatment options discussed in detail. Advised pt about offloading techniques. Pt given option of prefabricated insoles with offloading pads. Paring of 2 benign hyperkeratotic lesions took place with #10 blade or tissue nippers. Urea 40% cream otc. OTC treatments for a callous were discussed. Patient is low level medical decision making. Patient is acute uncomplicated condition low stable chronic condition. No new testing or prescription. Lower extremity at this time  Offloading options discussed for the hammertoe deformity. Appropriate shoe gear discussed. Contact office with any questions/problems/concerns. RTC in 3month(s).

## 2022-09-13 ENCOUNTER — HOSPITAL ENCOUNTER (OUTPATIENT)
Dept: WOUND CARE | Age: 72
Discharge: HOME | End: 2022-09-14
Payer: MEDICARE

## 2022-09-13 VITALS
SYSTOLIC BLOOD PRESSURE: 134 MMHG | RESPIRATION RATE: 18 BRPM | WEIGHT: 207 LBS | BODY MASS INDEX: 38.09 KG/M2 | DIASTOLIC BLOOD PRESSURE: 82 MMHG | HEART RATE: 90 BPM | HEIGHT: 62 IN | TEMPERATURE: 98.2 F

## 2022-09-13 PROCEDURE — 99212 OFFICE O/P EST SF 10 MIN: CPT | Performed by: SURGERY

## 2022-09-13 PROCEDURE — 99212 OFFICE O/P EST SF 10 MIN: CPT

## 2022-09-13 NOTE — PLAN OF CARE
Problem: Cognitive:  Goal: Knowledge of wound care  Description: Knowledge of wound care  Outcome: Progressing  Goal: Understands risk factors for wounds  Description: Understands risk factors for wounds  Outcome: Progressing     Problem: Wound:  Goal: Will show signs of wound healing; wound closure and no evidence of infection  Description: Will show signs of wound healing; wound closure and no evidence of infection  Outcome: Progressing     Problem: Pressure Ulcer:  Goal: Signs of wound healing will improve  Description: Signs of wound healing will improve  Outcome: Progressing  Goal: Absence of new pressure ulcer  Description: Absence of new pressure ulcer  Outcome: Progressing  Goal: Will show no infection signs and symptoms  Description: Will show no infection signs and symptoms  Outcome: Progressing     Problem: Venous:  Goal: Signs of wound healing will improve  Description: Signs of wound healing will improve  Outcome: Progressing     Problem: Compression therapy:  Goal: Will be free from complications associated with compression therapy  Description: Will be free from complications associated with compression therapy  Outcome: Progressing     Problem: Weight control:  Goal: Ability to maintain an optimal weight for height and age will be supported  Description: Ability to maintain an optimal weight for height and age will be supported  Outcome: Progressing     Problem: Falls - Risk of:  Goal: Will remain free from falls  Description: Will remain free from falls  Outcome: Progressing

## 2022-09-13 NOTE — PROGRESS NOTES
Patient we had seen previous now comes in with a relapse of her right ankle ulcer. First noticed it in July when it spontaneously drained a lot of serous fluid, soaking her stocking and shoes. First wound care visit this episode 8/16/2022. On on hydrocolloid and compression. She saw Dr. Rashid Hamm, podiatry yesterday. /82   Pulse 90   Temp 98.2 °F (36.8 °C) (Tympanic)   Resp 18   Ht 5' 2\" (1.575 m)   Wt 207 lb (93.9 kg)   BMI 37.86 kg/m²         Wound is healed. Edema is fairly well controlled    IMP/PLAN  1) Venous stasis ulcer - recurrent - left ankle - with lymphedema as well    - Wound is healed. - It will be vulnerable for recurrence   - Continuing thin hydrocolloid indefinately will help protect he skin   - Continue with compression stocking and lymphedema pumps.     2) Follow up PRN

## 2022-10-31 ENCOUNTER — TELEPHONE (OUTPATIENT)
Dept: WOUND CARE | Age: 72
End: 2022-10-31

## 2022-10-31 NOTE — TELEPHONE ENCOUNTER
10/28/22 1619: Received a voicemail from patient stating wound to left ankle is draining and would like to scheduled an appt. Schedule wound care appt with Dr. Memo Hurt 11/6/22. Patient is using hydrocolloid to wound. Patient called back asking to be seen sooner as her wound has increase drainage. Left voicemail to patient next available appt with Dr. Memo Hurt. Patient called clinic back upset she can't get in sooner since she is concerned it is infected. 10/31/22: Left patient voicemail to be seen 11/1/22 with Dr. Memo Hurt.

## 2022-11-01 ENCOUNTER — HOSPITAL ENCOUNTER (OUTPATIENT)
Dept: WOUND CARE | Age: 72
Discharge: HOME | End: 2022-11-02
Payer: MEDICARE

## 2022-11-01 VITALS
HEIGHT: 62 IN | BODY MASS INDEX: 37.91 KG/M2 | DIASTOLIC BLOOD PRESSURE: 86 MMHG | WEIGHT: 206 LBS | SYSTOLIC BLOOD PRESSURE: 136 MMHG | HEART RATE: 76 BPM | RESPIRATION RATE: 18 BRPM | TEMPERATURE: 98.3 F

## 2022-11-01 PROCEDURE — 86403 PARTICLE AGGLUT ANTBDY SCRN: CPT

## 2022-11-01 PROCEDURE — 87176 TISSUE HOMOGENIZATION CULTR: CPT

## 2022-11-01 PROCEDURE — 87075 CULTR BACTERIA EXCEPT BLOOD: CPT

## 2022-11-01 PROCEDURE — 11042 DBRDMT SUBQ TIS 1ST 20SQCM/<: CPT | Performed by: SURGERY

## 2022-11-01 PROCEDURE — 11042 DBRDMT SUBQ TIS 1ST 20SQCM/<: CPT

## 2022-11-01 PROCEDURE — 87070 CULTURE OTHR SPECIMN AEROBIC: CPT

## 2022-11-01 PROCEDURE — 87205 SMEAR GRAM STAIN: CPT

## 2022-11-01 ASSESSMENT — PAIN SCALES - GENERAL: PAINLEVEL_OUTOF10: 0

## 2022-11-01 NOTE — PROGRESS NOTES
This is a lady's initially saw about 1 year ago with a chronic ulcer of her right ankle. Combination of venous stasis ulcer, lymphedema. Using a combination of compression and hydrocolloid dressings she was declared healed 9/13/2022. Unfortunately a week or so ago patient had a fall and thinks she may have injured the area at that time. Although the exact mechanism of the injury she is not sure. In any event she noticed development of ulcer in the same location which she has been treating with hydrocolloid. Unfortunately despite that it seems to be getting larger. He has been diligent in the use of the lymphedema, and compression stockings. /86   Pulse 76   Temp 98.3 °F (36.8 °C) (Tympanic)   Resp 18   Ht 5' 2\" (1.575 m)   Wt 206 lb (93.4 kg)   BMI 37.68 kg/m²       Full thickness ulcer, with abnormal (thin) surrounding skin. Minimal exudate. Procedure note:  [facility] wound(s) debrided. Devitalized, necrotic, fibrinous material and biofilm was removed. Hemostasis by direct pressure as needed. Instruments used:   Curette: Yes   Forceps and scissors:  No   Scalpel: No   Tissue nippers or rongeur: No  Additional injected local anesthetic: No  Tissue obtained for culture: Yes  Additional hemostatic agents used:   Silver Nitrate: No   Electrocautery: No   Sutures: No   Topical agents (gel foam, thrombin, Surgicel): No  Depth of Debridement - subcutaneous  Debridement Size:  Roughly 3.2 cm2      IMP/PLAN  1) Venous stasis ulcer - relapse secondary to trauma. 2) Continue compression  3) Culture taken  4) Has responded well to hydrofera blue in the past.  Will try that. 5) Recheck in 1 weeks  6) She is concerned that she won't be able to go on a trip to [facility] at the end of the month. Unless she has a bad infection, I see no reason she cannot go.

## 2022-11-01 NOTE — DISCHARGE INSTRUCTIONS
Start hydoferablue dressing to right ankle wound. Cut foam to fit wound, cover with dry dressing, change dressing every 2-3 days. OK to shower, pat dry. Continue to wear compression stocking, elevate legs, and do lymphedema pumps daily. SIGNS OF INFECTION  - Redness, swelling, skin hot  - Wound bed turns black or stringy yellow  - Foul odor  - Increased drainage or pus  - Increased pain  - Fever greater than 100F    CALL YOUR DOCTOR OR SEEK MEDICAL ATTENTION IF SIGNS OF INFECTION.   DO NOT WAIT UNTIL YOUR NEXT APPOINTMENT    Call the Wound Care Nurse with any other questions or concerns- [phone]  Follow up as scheduled

## 2022-11-01 NOTE — PLAN OF CARE
Problem: Pain  Goal: Verbalizes/displays adequate comfort level or baseline comfort level  Outcome: Progressing     Problem: Cognitive:  Goal: Knowledge of wound care  Description: Knowledge of wound care  Outcome: Progressing  Goal: Understands risk factors for wounds  Description: Understands risk factors for wounds  Outcome: Progressing     Problem: Wound:  Goal: Will show signs of wound healing; wound closure and no evidence of infection  Description: Will show signs of wound healing; wound closure and no evidence of infection  Outcome: Progressing     Problem: Venous:  Goal: Signs of wound healing will improve  Description: Signs of wound healing will improve  Outcome: Progressing     Problem: Compression therapy:  Goal: Will be free from complications associated with compression therapy  Description: Will be free from complications associated with compression therapy  Outcome: Progressing     Problem: Weight control:  Goal: Ability to maintain an optimal weight for height and age will be supported  Description: Ability to maintain an optimal weight for height and age will be supported  Outcome: Progressing     Problem: Falls - Risk of:  Goal: Will remain free from falls  Description: Will remain free from falls  Outcome: Progressing

## 2022-11-06 LAB
CULTURE: NORMAL
CULTURE: NORMAL
DIRECT EXAM: NORMAL
DIRECT EXAM: NORMAL
SPECIMEN DESCRIPTION: NORMAL

## 2022-11-08 ENCOUNTER — HOSPITAL ENCOUNTER (OUTPATIENT)
Dept: WOUND CARE | Age: 72
Discharge: HOME | End: 2022-11-09
Payer: MEDICARE

## 2022-11-08 VITALS
SYSTOLIC BLOOD PRESSURE: 138 MMHG | HEIGHT: 62 IN | TEMPERATURE: 98.1 F | DIASTOLIC BLOOD PRESSURE: 70 MMHG | RESPIRATION RATE: 17 BRPM | HEART RATE: 99 BPM | OXYGEN SATURATION: 96 % | BODY MASS INDEX: 37.73 KG/M2 | WEIGHT: 205 LBS

## 2022-11-08 PROCEDURE — 11042 DBRDMT SUBQ TIS 1ST 20SQCM/<: CPT | Performed by: SURGERY

## 2022-11-08 PROCEDURE — 11042 DBRDMT SUBQ TIS 1ST 20SQCM/<: CPT

## 2022-11-08 ASSESSMENT — PAIN SCALES - GENERAL: PAINLEVEL_OUTOF10: 0

## 2022-11-08 NOTE — DISCHARGE INSTRUCTIONS
Start hydrogel and hydroferablue dressing to right ankle wound, change every other day. SIGNS OF INFECTION  - Redness, swelling, skin hot  - Wound bed turns black or stringy yellow  - Foul odor  - Increased drainage or pus  - Increased pain  - Fever greater than 100F    CALL YOUR DOCTOR OR SEEK MEDICAL ATTENTION IF SIGNS OF INFECTION.   DO NOT WAIT UNTIL YOUR NEXT APPOINTMENT    Call the Wound Care Nurse with any other questions or concerns- [phone]  Follow up as scheduled

## 2022-11-08 NOTE — PROGRESS NOTES
This is a lady's initially saw about 1 year ago with a chronic ulcer of her right ankle. Combination of venous stasis ulcer, lymphedema. Using a combination of compression and hydrocolloid dressings she was declared healed 9/13/2022. Seen for a recurrent after a fall 11/1/2022  He has been diligent in the use of the lymphedema, and compression stockings. Currently using hydrofera blue  Tissue culture was negative. /70   Pulse 99   Temp 98.1 °F (36.7 °C) (Tympanic)   Resp 17   Ht 5' 2\" (1.575 m)   Wt 205 lb (93 kg)   SpO2 96%   BMI 37.49 kg/m²         Measure slightly smaller    Some fibrinous material in the wound. IMP/PLAN  1) Venous Stasis Ulcer - slightly improved. - looks a little desiccated so will add hydrogel to the hydrofera.

## 2022-11-22 ENCOUNTER — HOSPITAL ENCOUNTER (OUTPATIENT)
Dept: WOUND CARE | Age: 72
Discharge: HOME | End: 2022-11-23
Payer: MEDICARE

## 2022-11-22 VITALS
RESPIRATION RATE: 18 BRPM | BODY MASS INDEX: 37.54 KG/M2 | DIASTOLIC BLOOD PRESSURE: 82 MMHG | SYSTOLIC BLOOD PRESSURE: 126 MMHG | WEIGHT: 204 LBS | TEMPERATURE: 99.5 F | HEART RATE: 84 BPM | HEIGHT: 62 IN

## 2022-11-22 PROCEDURE — 11042 DBRDMT SUBQ TIS 1ST 20SQCM/<: CPT

## 2022-11-22 PROCEDURE — 11042 DBRDMT SUBQ TIS 1ST 20SQCM/<: CPT | Performed by: SURGERY

## 2022-11-22 NOTE — PROGRESS NOTES
This is a lady's initially saw about 1 year ago with a chronic ulcer of her right ankle. Combination of venous stasis ulcer, lymphedema. Using a combination of compression and hydrocolloid dressings she was declared healed 9/13/2022. Seen for a recurrent after a fall 11/1/2022  He has been diligent in the use of the lymphedema, and compression stockings. Currently using hydrofera blue and hydrogel  Tissue culture was negative. Another area has opened superior to the initial wound, and she has developed black fine lines around the ulcer. /82   Pulse 84   Temp 99.5 °F (37.5 °C) (Tympanic)   Resp 18   Ht 5' 2\" (1.575 m)   Wt 204 lb (92.5 kg)   BMI 37.31 kg/m²       The main wound is definitely a little smaller. She has developed a new ulcer superiorly. Looking at the photo from 11/8 there a a scab like black spot in this area. She has has the fine black lines/spots around the wound. There are under some dry skin, and may represent small hemorrhages or thrombosis. Procedure note:  [facility] wound(s) debrided. Devitalized, necrotic, fibrinous material and biofilm was removed. Hemostasis by direct pressure as needed. Instruments used:   Curette: Yes   Forceps and scissors:  No   Scalpel: No   Tissue nippers or rongeur: No  Additional injected local anesthetic: No  Tissue obtained for culture: No  Additional hemostatic agents used:   Silver Nitrate: Yes   Electrocautery: Yes   Sutures: Yes   Topical agents (gel foam, thrombin, Surgicel): No  Depth of Debridement - subcutaneous  Debridement Size:  Roughly about 1 cm2    IMP/PLAN  1) Continue to monitor  2) Continue hydrofera and hydrocolloid  3) May need a wound biopsy if the wound gets worse.

## 2022-11-22 NOTE — PLAN OF CARE
Problem: Pain  Goal: Verbalizes/displays adequate comfort level or baseline comfort level  Outcome: Progressing  Flowsheets (Taken 11/22/2022 1125)  Verbalizes/displays adequate comfort level or baseline comfort level: Assess pain using appropriate pain scale     Problem: Cognitive:  Goal: Knowledge of wound care  Description: Knowledge of wound care  Outcome: Progressing  Goal: Understands risk factors for wounds  Description: Understands risk factors for wounds  Outcome: Progressing     Problem: Wound:  Goal: Will show signs of wound healing; wound closure and no evidence of infection  Description: Will show signs of wound healing; wound closure and no evidence of infection  Outcome: Progressing     Problem: Venous:  Goal: Signs of wound healing will improve  Description: Signs of wound healing will improve  Outcome: Progressing     Problem: Compression therapy:  Goal: Will be free from complications associated with compression therapy  Description: Will be free from complications associated with compression therapy  Outcome: Progressing     Problem: Weight control:  Goal: Ability to maintain an optimal weight for height and age will be supported  Description: Ability to maintain an optimal weight for height and age will be supported  Outcome: Progressing     Problem: Falls - Risk of:  Goal: Will remain free from falls  Description: Will remain free from falls  Outcome: Progressing

## 2022-11-22 NOTE — DISCHARGE INSTRUCTIONS
Continue to same dressing to right foot wound, change every other day. Cover with dry dressing. SIGNS OF INFECTION  - Redness, swelling, skin hot  - Wound bed turns black or stringy yellow  - Foul odor  - Increased drainage or pus  - Increased pain  - Fever greater than 100F    CALL YOUR DOCTOR OR SEEK MEDICAL ATTENTION IF SIGNS OF INFECTION.   DO NOT WAIT UNTIL YOUR NEXT APPOINTMENT    Call the Wound Care Nurse with any other questions or concerns- [phone]  Follow up as scheduled

## 2022-12-13 ENCOUNTER — HOSPITAL ENCOUNTER (OUTPATIENT)
Dept: WOUND CARE | Age: 72
Discharge: HOME | End: 2022-12-14
Payer: MEDICARE

## 2022-12-13 VITALS
WEIGHT: 206 LBS | SYSTOLIC BLOOD PRESSURE: 138 MMHG | HEIGHT: 62 IN | BODY MASS INDEX: 37.91 KG/M2 | HEART RATE: 98 BPM | DIASTOLIC BLOOD PRESSURE: 82 MMHG | RESPIRATION RATE: 18 BRPM | TEMPERATURE: 97.8 F

## 2022-12-13 PROCEDURE — 99212 OFFICE O/P EST SF 10 MIN: CPT

## 2022-12-13 NOTE — PLAN OF CARE
Problem: Pain  Goal: Verbalizes/displays adequate comfort level or baseline comfort level  Outcome: Progressing     Problem: Cognitive:  Goal: Knowledge of wound care  Description: Knowledge of wound care  Outcome: Progressing  Goal: Understands risk factors for wounds  Description: Understands risk factors for wounds  Outcome: Progressing     Problem: Wound:  Goal: Will show signs of wound healing; wound closure and no evidence of infection  Description: Will show signs of wound healing; wound closure and no evidence of infection  Outcome: Progressing     Problem: Compression therapy:  Goal: Will be free from complications associated with compression therapy  Description: Will be free from complications associated with compression therapy  Outcome: Progressing     Problem: Weight control:  Goal: Ability to maintain an optimal weight for height and age will be supported  Description: Ability to maintain an optimal weight for height and age will be supported  Outcome: Progressing     Problem: Falls - Risk of:  Goal: Will remain free from falls  Description: Will remain free from falls  Outcome: Progressing

## 2022-12-13 NOTE — DISCHARGE INSTRUCTIONS
Continue hydrogel and hydrofera blue dressing to right ankle wound, change daily. SIGNS OF INFECTION  - Redness, swelling, skin hot  - Wound bed turns black or stringy yellow  - Foul odor  - Increased drainage or pus  - Increased pain  - Fever greater than 100F    CALL YOUR DOCTOR OR SEEK MEDICAL ATTENTION IF SIGNS OF INFECTION.   DO NOT WAIT UNTIL YOUR NEXT APPOINTMENT    Call the Wound Care Nurse with any other questions or concerns- [phone]  Follow up as scheduled

## 2022-12-13 NOTE — PROGRESS NOTES
This is a lady's initially saw about 1 year ago with a chronic ulcer of her right ankle. Combination of venous stasis ulcer, lymphedema. Using a combination of compression and hydrocolloid dressings she was declared healed 9/13/2022. Seen for a recurrent after a fall 11/1/2022  He has been diligent in the use of the lymphedema, and compression stockings. Currently using hydrofera blue and hydrogel    /82   Pulse 98   Temp 97.8 °F (36.6 °C) (Tympanic)   Resp 18   Ht 5' 2\" (1.575 m)   Wt 206 lb (93.4 kg)   BMI 37.68 kg/m²         Nearly healed. IMP/PLAN  1) Marked improvement - continue current treatment.   2) Follow up wound care 3 weeks

## 2023-01-17 ENCOUNTER — HOSPITAL ENCOUNTER (OUTPATIENT)
Dept: WOUND CARE | Age: 73
Discharge: HOME | End: 2023-01-18
Payer: MEDICARE

## 2023-01-17 VITALS
SYSTOLIC BLOOD PRESSURE: 134 MMHG | TEMPERATURE: 98.1 F | HEIGHT: 62 IN | BODY MASS INDEX: 37.73 KG/M2 | RESPIRATION RATE: 18 BRPM | DIASTOLIC BLOOD PRESSURE: 80 MMHG | HEART RATE: 76 BPM | WEIGHT: 205 LBS

## 2023-01-17 PROCEDURE — 99212 OFFICE O/P EST SF 10 MIN: CPT | Performed by: SURGERY

## 2023-01-17 PROCEDURE — 99212 OFFICE O/P EST SF 10 MIN: CPT

## 2023-01-17 NOTE — PLAN OF CARE
Problem: Pain  Goal: Verbalizes/displays adequate comfort level or baseline comfort level  Outcome: Progressing  Flowsheets (Taken 1/17/2023 3562)  Verbalizes/displays adequate comfort level or baseline comfort level: Assess pain using appropriate pain scale     Problem: Cognitive:  Goal: Knowledge of wound care  Description: Knowledge of wound care  Outcome: Progressing  Goal: Understands risk factors for wounds  Description: Understands risk factors for wounds  Outcome: Progressing     Problem: Wound:  Goal: Will show signs of wound healing; wound closure and no evidence of infection  Description: Will show signs of wound healing; wound closure and no evidence of infection  Outcome: Progressing     Problem: Falls - Risk of:  Goal: Will remain free from falls  Description: Will remain free from falls  Outcome: Progressing

## 2023-01-17 NOTE — PROGRESS NOTES
This is a lady's initially saw about 1 year ago with a chronic ulcer of her right ankle. Combination of venous stasis ulcer, lymphedema. Using a combination of compression and hydrocolloid dressings she was declared healed 9/13/2022. Seen for a recurrent after a fall 11/1/2022  He has been diligent in the use of the lymphedema, and compression stockings. Currently using hydrofera blue and hydrogel    No pain or drainage. /80   Pulse 76   Temp 98.1 °F (36.7 °C) (Tympanic)   Resp 18   Ht 5' 2\" (1.575 m)   Wt 205 lb (93 kg)   BMI 37.49 kg/m²         The ulcer is healed. She has very thin skin, with small scabs in the skin. The is a little fissuring    IMP/PLAN  1) As has already happened this are will be very vulnerable to recurrence. 2) Reviewing he old photo, the area of very thin skin seems to be small. So, if the skin is protected I think it may mature with time and be less vulnerable. - I want her to use thin hydrocolloid. She can go up to 1 week between changes. - She needs to continue to use had pumps and compression garment to manage her edema. - I would like to recheck this in 6 weeks.

## 2023-02-28 ENCOUNTER — HOSPITAL ENCOUNTER (OUTPATIENT)
Dept: WOUND CARE | Age: 73
Discharge: HOME | End: 2023-03-01
Payer: MEDICARE

## 2023-02-28 VITALS
BODY MASS INDEX: 38.83 KG/M2 | SYSTOLIC BLOOD PRESSURE: 132 MMHG | WEIGHT: 211 LBS | HEIGHT: 62 IN | RESPIRATION RATE: 20 BRPM | TEMPERATURE: 98.4 F | HEART RATE: 115 BPM | DIASTOLIC BLOOD PRESSURE: 88 MMHG

## 2023-02-28 PROCEDURE — 99212 OFFICE O/P EST SF 10 MIN: CPT

## 2023-02-28 NOTE — PROGRESS NOTES
This is a lady's initially saw about 1 year ago with a chronic ulcer of her right ankle.  Combination of venous stasis ulcer, lymphedema.  Using a combination of compression and hydrocolloid dressings she was declared healed 9/13/2022.  Seen for a recurrent after a fall 11/1/2022  He has been diligent in the use of the lymphedema, and compression stockings.     Currently using hydrocolloid.     No pain or drainage.    /88   Pulse (!) 115   Temp 98.4 °F (36.9 °C) (Tympanic)   Resp 20   Ht 5' 2\" (1.575 m)   Wt 211 lb (95.7 kg)   BMI 38.59 kg/m²         Her leg edema seems well controlled.  I moistened the scab with some saline and lidocaine gel.  It wiped away leaving a very minimal partial thickness ulcer.        IMP/PLAN  1) For all practical purposes this ulcer is healed.  Because of her venous disease and abnormal skin she is at high risk for recurrence.  I recommend:  - Continue with the strategies to minimize edema in her legs, compression pumps and compression garment.  - Keep a thin hydrocolloid over the area.  I recommend using this indefinitely to protect her skin.  2) Follow up PRN

## 2023-10-03 ENCOUNTER — HOSPITAL ENCOUNTER (OUTPATIENT)
Dept: WOUND CARE | Age: 73
Discharge: HOME | End: 2023-10-04
Payer: MEDICARE

## 2023-10-03 ENCOUNTER — HOSPITAL ENCOUNTER (OUTPATIENT)
Age: 73
Discharge: HOME | End: 2023-10-03
Payer: MEDICARE

## 2023-10-03 VITALS
RESPIRATION RATE: 16 BRPM | TEMPERATURE: 99.3 F | HEART RATE: 90 BPM | DIASTOLIC BLOOD PRESSURE: 90 MMHG | BODY MASS INDEX: 38.09 KG/M2 | WEIGHT: 207 LBS | HEIGHT: 62 IN | SYSTOLIC BLOOD PRESSURE: 144 MMHG

## 2023-10-03 DIAGNOSIS — L97.319 VENOUS STASIS ULCER OF ANKLE, RIGHT (HCC): ICD-10-CM

## 2023-10-03 DIAGNOSIS — L97.319 VENOUS STASIS ULCER OF ANKLE, RIGHT (HCC): Primary | ICD-10-CM

## 2023-10-03 DIAGNOSIS — I83.013 VENOUS STASIS ULCER OF ANKLE, RIGHT (HCC): ICD-10-CM

## 2023-10-03 DIAGNOSIS — I83.013 VENOUS STASIS ULCER OF ANKLE, RIGHT (HCC): Primary | ICD-10-CM

## 2023-10-03 LAB
ALBUMIN SERPL-MCNC: 4.4 G/DL (ref 3.5–5.2)
ALBUMIN/GLOB SERPL: 1.8 {RATIO} (ref 1–2.5)
ALP SERPL-CCNC: 74 U/L (ref 35–104)
ALT SERPL-CCNC: 17 U/L (ref 5–33)
ANION GAP SERPL CALCULATED.3IONS-SCNC: 8 MMOL/L (ref 9–17)
AST SERPL-CCNC: 18 U/L
BASOPHILS # BLD: 0.04 K/UL (ref 0–0.2)
BASOPHILS NFR BLD: 1 % (ref 0–2)
BILIRUB SERPL-MCNC: 2 MG/DL (ref 0.3–1.2)
BUN SERPL-MCNC: 17 MG/DL (ref 8–23)
BUN/CREAT SERPL: 19 (ref 9–20)
CALCIUM SERPL-MCNC: 10.2 MG/DL (ref 8.6–10.4)
CHLORIDE SERPL-SCNC: 104 MMOL/L (ref 98–107)
CO2 SERPL-SCNC: 30 MMOL/L (ref 20–31)
CREAT SERPL-MCNC: 0.9 MG/DL (ref 0.5–0.9)
EOSINOPHIL # BLD: 0.08 K/UL (ref 0–0.44)
EOSINOPHILS RELATIVE PERCENT: 2 % (ref 1–4)
ERYTHROCYTE [DISTWIDTH] IN BLOOD BY AUTOMATED COUNT: 12.4 % (ref 11.8–14.4)
GFR SERPL CREATININE-BSD FRML MDRD: >60 ML/MIN/1.73M2
GLUCOSE SERPL-MCNC: 92 MG/DL (ref 70–99)
HCT VFR BLD AUTO: 40.5 % (ref 36.3–47.1)
HGB BLD-MCNC: 13.7 G/DL (ref 11.9–15.1)
IMM GRANULOCYTES # BLD AUTO: <0.03 K/UL (ref 0–0.3)
IMM GRANULOCYTES NFR BLD: 0 %
LYMPHOCYTES NFR BLD: 0.96 K/UL (ref 1.1–3.7)
LYMPHOCYTES RELATIVE PERCENT: 21 % (ref 24–43)
MCH RBC QN AUTO: 33.1 PG (ref 25.2–33.5)
MCHC RBC AUTO-ENTMCNC: 33.8 G/DL (ref 25.2–33.5)
MCV RBC AUTO: 97.8 FL (ref 82.6–102.9)
MONOCYTES NFR BLD: 0.38 K/UL (ref 0.1–1.2)
MONOCYTES NFR BLD: 8 % (ref 3–12)
NEUTROPHILS NFR BLD: 68 % (ref 36–65)
NEUTS SEG NFR BLD: 3.08 K/UL (ref 1.5–8.1)
NRBC BLD-RTO: 0 PER 100 WBC
PLATELET # BLD AUTO: 205 K/UL (ref 138–453)
PMV BLD AUTO: 10.1 FL (ref 8.1–13.5)
POTASSIUM SERPL-SCNC: 4.7 MMOL/L (ref 3.7–5.3)
PROT SERPL-MCNC: 6.8 G/DL (ref 6.4–8.3)
RBC # BLD AUTO: 4.14 M/UL (ref 3.95–5.11)
SODIUM SERPL-SCNC: 142 MMOL/L (ref 135–144)
WBC OTHER # BLD: 4.6 K/UL (ref 3.5–11.3)

## 2023-10-03 PROCEDURE — 87070 CULTURE OTHR SPECIMN AEROBIC: CPT

## 2023-10-03 PROCEDURE — 87176 TISSUE HOMOGENIZATION CULTR: CPT

## 2023-10-03 PROCEDURE — 11042 DBRDMT SUBQ TIS 1ST 20SQCM/<: CPT

## 2023-10-03 PROCEDURE — 11042 DBRDMT SUBQ TIS 1ST 20SQCM/<: CPT | Performed by: SURGERY

## 2023-10-03 PROCEDURE — 87186 SC STD MICRODIL/AGAR DIL: CPT

## 2023-10-03 PROCEDURE — 86403 PARTICLE AGGLUT ANTBDY SCRN: CPT

## 2023-10-03 PROCEDURE — 85025 COMPLETE CBC W/AUTO DIFF WBC: CPT

## 2023-10-03 PROCEDURE — 87205 SMEAR GRAM STAIN: CPT

## 2023-10-03 PROCEDURE — 29580 STRAPPING UNNA BOOT: CPT

## 2023-10-03 PROCEDURE — 36415 COLL VENOUS BLD VENIPUNCTURE: CPT

## 2023-10-03 PROCEDURE — 87075 CULTR BACTERIA EXCEPT BLOOD: CPT

## 2023-10-03 PROCEDURE — 87147 CULTURE TYPE IMMUNOLOGIC: CPT

## 2023-10-03 PROCEDURE — 80053 COMPREHEN METABOLIC PANEL: CPT

## 2023-10-03 ASSESSMENT — PAIN SCALES - GENERAL: PAINLEVEL_OUTOF10: 2

## 2023-10-03 NOTE — PROGRESS NOTES
Multilayer Compression Wrap   (Not Unna) Below the Knee    NAME:  Jacquelynn Boast  YOB: 1950  MEDICAL RECORD NUMBER:  [id number]  DATE:  10/3/2023    Multilayer compression wrap: Removed old Multilayer wrap if indicated and wash leg with mild soap/water. Applied moisturizing agent to dry skin as needed. Applied primary and secondary dressing as ordered. Applied multilayered dressing below the knee to right lower leg. Applied multilayered dressing below the knee to left lower leg. Instructed patient/caregiver not to remove dressing and to keep it clean and dry. Instructed patient/caregiver on complications to report to provider, such as pain, numbness in toes, heavy drainage, and slippage of dressing. Instructed patient on purpose of compression dressing and on activity and exercise recommendations.       Electronically signed by Daniela Crowell RN on 10/3/2023 at 10:13 AM

## 2023-10-03 NOTE — PLAN OF CARE
Problem: Cognitive:  Goal: Knowledge of wound care  Description: Knowledge of wound care  Outcome: Progressing  Goal: Understands risk factors for wounds  Description: Understands risk factors for wounds  Outcome: Progressing     Problem: Wound:  Goal: Will show signs of wound healing; wound closure and no evidence of infection  Description: Will show signs of wound healing; wound closure and no evidence of infection  Outcome: Progressing     Problem: Pressure Ulcer:  Goal: Signs of wound healing will improve  Description: Signs of wound healing will improve  Outcome: Progressing  Goal: Absence of new pressure ulcer  Description: Absence of new pressure ulcer  Outcome: Progressing  Goal: Will show no infection signs and symptoms  Description: Will show no infection signs and symptoms  Outcome: Progressing      Problem: Venous:  Goal: Signs of wound healing will improve  Description: Signs of wound healing will improve  Outcome: Progressing     Problem: Compression therapy:  Goal: Will be free from complications associated with compression therapy  Description: Will be free from complications associated with compression therapy  Outcome: Not Progressing     Problem: Weight control:  Goal: Ability to maintain an optimal weight for height and age will be supported  Description: Ability to maintain an optimal weight for height and age will be supported  Outcome: Not Progressing     Problem: Falls - Risk of:  Goal: Will remain free from falls  Description: Will remain free from falls  Outcome: Not Progressing     Problem: Blood Glucose:  Goal: Ability to maintain appropriate glucose levels will improve  Description: Ability to maintain appropriate glucose levels will improve  Outcome: Not Progressing

## 2023-10-03 NOTE — DISCHARGE INSTRUCTIONS
Today a compression dressing has been applied to your lower leg. Its purpose is to reduce swelling and help treat your wound. It will stay on until your next appointment. 1. It must stay dry. You can shower with a bag covering it or purchase a shower boot at  a medical supply store. 2. If the dressing falls more than 2 inches or gets wet, call us ([phone]) to come in  to have it changed. 3. If the top or bottom rolls, you can snip through it to relieve the constriction. 4. To help reduce swelling, when sitting elevate your leg, preferably to heart level. Avoid standing in one place for long periods of time. 5.  A rare complication is a decrease of arterial blood flow to your toes. If your   leg becomes more painful with numbness or tingling or a purple color to your toes,  carefully remove the dressing by cutting it off or unwrapping it. If normal sensation does not return to the limb, seek medical help. Get lab work done    Follow up with Dr. Dari Herbert in 1 week as scheduled.

## 2023-10-07 LAB
MICROORGANISM SPEC CULT: ABNORMAL
MICROORGANISM SPEC CULT: ABNORMAL
MICROORGANISM/AGENT SPEC: ABNORMAL
MICROORGANISM/AGENT SPEC: ABNORMAL
SPECIMEN DESCRIPTION: ABNORMAL

## 2023-10-10 ENCOUNTER — HOSPITAL ENCOUNTER (OUTPATIENT)
Dept: WOUND CARE | Age: 73
Discharge: HOME | End: 2023-10-11
Attending: SURGERY | Admitting: SURGERY
Payer: MEDICARE

## 2023-10-10 VITALS
HEART RATE: 88 BPM | WEIGHT: 208 LBS | RESPIRATION RATE: 16 BRPM | BODY MASS INDEX: 38.28 KG/M2 | DIASTOLIC BLOOD PRESSURE: 94 MMHG | HEIGHT: 62 IN | SYSTOLIC BLOOD PRESSURE: 140 MMHG | TEMPERATURE: 98.7 F

## 2023-10-10 PROCEDURE — 99213 OFFICE O/P EST LOW 20 MIN: CPT | Performed by: SURGERY

## 2023-10-10 PROCEDURE — 29581 APPL MULTLAYER CMPRN SYS LEG: CPT

## 2023-10-10 PROCEDURE — 99213 OFFICE O/P EST LOW 20 MIN: CPT

## 2023-10-10 ASSESSMENT — PAIN SCALES - GENERAL: PAINLEVEL_OUTOF10: 5

## 2023-10-10 NOTE — PLAN OF CARE
Problem: Pain  Goal: Verbalizes/displays adequate comfort level or baseline comfort level  Outcome: Progressing  Flowsheets (Taken 10/10/2023 0901)  Verbalizes/displays adequate comfort level or baseline comfort level: Assess pain using appropriate pain scale     Problem: Cognitive:  Goal: Knowledge of wound care  Description: Knowledge of wound care  Outcome: Progressing  Goal: Understands risk factors for wounds  Description: Understands risk factors for wounds  Outcome: Progressing     Problem: Wound:  Goal: Will show signs of wound healing; wound closure and no evidence of infection  Description: Will show signs of wound healing; wound closure and no evidence of infection  Outcome: Progressing     Problem: Venous:  Goal: Signs of wound healing will improve  Description: Signs of wound healing will improve  Outcome: Progressing     Problem: Compression therapy:  Goal: Will be free from complications associated with compression therapy  Description: Will be free from complications associated with compression therapy  Outcome: Progressing     Problem: Weight control:  Goal: Ability to maintain an optimal weight for height and age will be supported  Description: Ability to maintain an optimal weight for height and age will be supported  Outcome: Progressing     Problem: Falls - Risk of:  Goal: Will remain free from falls  Description: Will remain free from falls  Outcome: Progressing

## 2023-10-17 ENCOUNTER — HOSPITAL ENCOUNTER (OUTPATIENT)
Dept: WOUND CARE | Age: 73
Discharge: HOME | End: 2023-10-18
Attending: SURGERY
Payer: MEDICARE

## 2023-10-17 VITALS
TEMPERATURE: 99.1 F | RESPIRATION RATE: 16 BRPM | WEIGHT: 208.8 LBS | HEART RATE: 88 BPM | BODY MASS INDEX: 38.42 KG/M2 | HEIGHT: 62 IN

## 2023-10-17 PROCEDURE — 29581 APPL MULTLAYER CMPRN SYS LEG: CPT

## 2023-10-17 PROCEDURE — 99212 OFFICE O/P EST SF 10 MIN: CPT

## 2023-10-17 NOTE — PLAN OF CARE
Problem: Pain  Goal: Verbalizes/displays adequate comfort level or baseline comfort level  Outcome: Progressing  Flowsheets (Taken 10/17/2023 1313)  Verbalizes/displays adequate comfort level or baseline comfort level: Assess pain using appropriate pain scale     Problem: Cognitive:  Goal: Knowledge of wound care  Description: Knowledge of wound care  Outcome: Progressing  Goal: Understands risk factors for wounds  Description: Understands risk factors for wounds  Outcome: Progressing     Problem: Wound:  Goal: Will show signs of wound healing; wound closure and no evidence of infection  Description: Will show signs of wound healing; wound closure and no evidence of infection  Outcome: Progressing     Problem: Venous:  Goal: Signs of wound healing will improve  Description: Signs of wound healing will improve  Outcome: Progressing     Problem: Compression therapy:  Goal: Will be free from complications associated with compression therapy  Description: Will be free from complications associated with compression therapy  Outcome: Progressing     Problem: Weight control:  Goal: Ability to maintain an optimal weight for height and age will be supported  Description: Ability to maintain an optimal weight for height and age will be supported  Outcome: Progressing     Problem: Falls - Risk of:  Goal: Will remain free from falls  Description: Will remain free from falls  Outcome: Progressing

## 2023-10-17 NOTE — PROGRESS NOTES
Patient presented for dressing change per order of Dr. Victoriano Novak. Old dressing removed. Periwound and ulcer(s) cleansed\ with normal saline. New dressing (hydofereablue and TLC) applied to right leg. Patient tolerated dressing change well. Wound Care Documentation:  Wound 10/03/23 Ankle Right; Inner (Active)   Wound Image   10/17/23 1313   Wound Etiology Venous 10/17/23 1313   Dressing Status Old drainage noted 10/17/23 1313   Wound Cleansed Cleansed with saline 10/17/23 1313   Dressing/Treatment Hydrofera blue 10/17/23 1313   Offloading for Diabetic Foot Ulcers Offloading not ordered 10/17/23 1313   Dressing Change Due 10/18/23 10/17/23 1313   Wound Length (cm) 2.8 cm 10/17/23 1313   Wound Width (cm) 1.1 cm 10/17/23 1313   Wound Depth (cm) 0.3 cm 10/17/23 1313   Wound Surface Area (cm^2) 3.08 cm^2 10/17/23 1313   Change in Wound Size % (l*w) 11.49 10/17/23 1313   Wound Volume (cm^3) 0.924 cm^3 10/17/23 1313   Wound Healing % -33 10/17/23 1313   Post-Procedure Length (cm) 2.6 cm 10/03/23 0944   Post-Procedure Width (cm) 1.4 cm 10/03/23 0944   Post-Procedure Depth (cm) 0.3 cm 10/03/23 0944   Post-Procedure Surface Area (cm^2) 3.64 cm^2 10/03/23 0944   Post-Procedure Volume (cm^3) 1.092 cm^3 10/03/23 0944   Wound Assessment Pink/red 10/17/23 1313   Drainage Amount Moderate (25-50%) 10/17/23 1313   Drainage Description Serosanguinous 10/17/23 1313   Odor Mild 10/17/23 1313   Pamela-wound Assessment Intact 10/17/23 1313   Margins Defined edges 10/17/23 1313   Wound Thickness Description not for Pressure Injury Full thickness 10/17/23 1313   Number of days: 14

## 2023-10-26 ENCOUNTER — HOSPITAL ENCOUNTER (OUTPATIENT)
Dept: WOUND CARE | Age: 73
Discharge: HOME | End: 2023-10-27
Attending: SURGERY
Payer: MEDICARE

## 2023-10-26 ENCOUNTER — HOSPITAL ENCOUNTER (OUTPATIENT)
Age: 73
Setting detail: SPECIMEN
Discharge: HOME | End: 2023-10-26

## 2023-10-26 VITALS
HEIGHT: 62 IN | SYSTOLIC BLOOD PRESSURE: 132 MMHG | HEART RATE: 106 BPM | BODY MASS INDEX: 38.64 KG/M2 | WEIGHT: 210 LBS | RESPIRATION RATE: 18 BRPM | TEMPERATURE: 98.3 F | DIASTOLIC BLOOD PRESSURE: 100 MMHG

## 2023-10-26 DIAGNOSIS — L97.319 VENOUS STASIS ULCER OF ANKLE, RIGHT (HCC): Primary | ICD-10-CM

## 2023-10-26 DIAGNOSIS — I83.013 VENOUS STASIS ULCER OF ANKLE, RIGHT (HCC): Primary | ICD-10-CM

## 2023-10-26 PROCEDURE — 87186 SC STD MICRODIL/AGAR DIL: CPT

## 2023-10-26 PROCEDURE — 87075 CULTR BACTERIA EXCEPT BLOOD: CPT

## 2023-10-26 PROCEDURE — 29581 APPL MULTLAYER CMPRN SYS LEG: CPT

## 2023-10-26 PROCEDURE — 87205 SMEAR GRAM STAIN: CPT

## 2023-10-26 PROCEDURE — 87070 CULTURE OTHR SPECIMN AEROBIC: CPT

## 2023-10-26 PROCEDURE — 87176 TISSUE HOMOGENIZATION CULTR: CPT

## 2023-10-26 RX ORDER — LIDOCAINE HYDROCHLORIDE 20 MG/ML
JELLY TOPICAL ONCE
OUTPATIENT
Start: 2023-10-26 | End: 2023-10-26

## 2023-10-26 RX ORDER — LIDOCAINE 40 MG/G
CREAM TOPICAL ONCE
OUTPATIENT
Start: 2023-10-26 | End: 2023-10-26

## 2023-10-26 RX ORDER — CLOBETASOL PROPIONATE 0.5 MG/G
OINTMENT TOPICAL ONCE
OUTPATIENT
Start: 2023-10-26 | End: 2023-10-26

## 2023-10-26 RX ORDER — GENTAMICIN SULFATE 1 MG/G
OINTMENT TOPICAL ONCE
OUTPATIENT
Start: 2023-10-26 | End: 2023-10-26

## 2023-10-26 RX ORDER — TRIAMCINOLONE ACETONIDE 1 MG/G
OINTMENT TOPICAL ONCE
OUTPATIENT
Start: 2023-10-26 | End: 2023-10-26

## 2023-10-26 RX ORDER — BACITRACIN ZINC AND POLYMYXIN B SULFATE 500; 1000 [USP'U]/G; [USP'U]/G
OINTMENT TOPICAL ONCE
OUTPATIENT
Start: 2023-10-26 | End: 2023-10-26

## 2023-10-26 RX ORDER — SODIUM CHLOR/HYPOCHLOROUS ACID 0.033 %
SOLUTION, IRRIGATION IRRIGATION ONCE
OUTPATIENT
Start: 2023-10-26 | End: 2023-10-26

## 2023-10-26 RX ORDER — LIDOCAINE 50 MG/G
OINTMENT TOPICAL ONCE
OUTPATIENT
Start: 2023-10-26 | End: 2023-10-26

## 2023-10-26 RX ORDER — BETAMETHASONE DIPROPIONATE 0.05 %
OINTMENT (GRAM) TOPICAL ONCE
OUTPATIENT
Start: 2023-10-26 | End: 2023-10-26

## 2023-10-26 RX ORDER — LIDOCAINE HYDROCHLORIDE 40 MG/ML
SOLUTION TOPICAL ONCE
OUTPATIENT
Start: 2023-10-26 | End: 2023-10-26

## 2023-10-26 RX ORDER — IBUPROFEN 200 MG
TABLET ORAL ONCE
OUTPATIENT
Start: 2023-10-26 | End: 2023-10-26

## 2023-10-26 RX ORDER — GINSENG 100 MG
CAPSULE ORAL ONCE
OUTPATIENT
Start: 2023-10-26 | End: 2023-10-26

## 2023-10-26 ASSESSMENT — PAIN SCALES - GENERAL: PAINLEVEL_OUTOF10: 0

## 2023-10-26 NOTE — DISCHARGE INSTRUCTIONS
Today a compression dressing has been applied to your lower leg. Its purpose is to reduce swelling and help treat your wound. It will stay on until your next appointment. 1. It must stay dry. You can shower with a bag covering it or purchase a shower boot at  a medical supply store. 2. If the dressing falls more than 2 inches or gets wet, call us ([phone]) to come in  to have it changed. 3. If the top or bottom rolls, you can snip through it to relieve the constriction. 4. To help reduce swelling, when sitting elevate your leg, preferably to heart level. Avoid standing in one place for long periods of time. 5.  A rare complication is a decrease of arterial blood flow to your toes. If your   leg becomes more painful with numbness or tingling or a purple color to your toes,  carefully remove the dressing by cutting it off or unwrapping it. If normal sensation does not return to the limb, seek medical help. Follow up with Wound care nurse in 1 week as scheduled. Follow up with Dr. Reeta Gitelman in 2 weeks as scheduled.

## 2023-10-26 NOTE — PROGRESS NOTES
Insurance Verification [address]:     [address]   [address] [facility]  [facility], [address] [place] [address] Av  Telephone: [phone]    Facility NPI: [id number]  Facility Tax ID [id number]    Jeffry Mills RN    Provider Information:     Provider's Name and NPI Dr. Keiko Mendoza [id number]    Patient Information:      Cintia Apple  [facility] [address]   [phone]   : 1950  AGE: 68 y.o. GENDER: female   TODAYS DATE:  10/26/2023      Insurance:        PRIMARY INSURANCE:  Plan: BCBS OUT OF STATE MANAGED [place]  Coverage: BCBS MEDICARE  Effective Date: 2021  Group Number: [unfilled]  Subscriber Number: [id number] - (Medicare Managed)    Payer/Plan Subscr  Sex Relation Sub. Ins. ID Effective Group Num   1.  BCBS MEDICARECintia Apple 1950 Female Self [id number]                                   [address]       Application/product Information:     Benefit Verification Request:    Reason for Request: New Wound    [employer] Fax# [phone]  [employer] Fax: [phone]    Trunk/Arms/Legs [phone] (1st 25 sq cm)    Apligraft, NuShield, and Puraply AM, Epifix     Has patient been treated with any other Skin Substitute for this Wound:   No    If yes, How many previous applications:  none    WOUND #: 1    Total Square CM: 3.19    Procedure setting: Physician Office  POS 22    Is the Patient currently in a skilled nursing facility: No     Is the wound work related: No    Procedure date: 10/31/23        Patient Information:     Dx Codes:   Diabetic Ulcer [] Yes   [x] No,   Venous Ulcer [x] Yes   [] No,   Other [] Yes   [x] No    Wound ICD-10 Code: P01.086    Problem List Items Addressed This Visit          Other    Venous stasis ulcer of ankle, right ([address]) - Primary    Relevant Orders    Initiate Outpatient Wound Care Protocol       No data recorded     Is the Patient a Diabetic: No    HgBA1c:  No

## 2023-10-26 NOTE — PROGRESS NOTES
Multilayer Compression Wrap   (Not Unna) Below the Knee    NAME:  Jacquelynn Boast  YOB: 1950  MEDICAL RECORD NUMBER:  [id number]  DATE:  10/26/2023    Multilayer compression wrap: Removed old Multilayer wrap if indicated and wash leg with mild soap/water. Applied moisturizing agent to dry skin as needed. Applied primary and secondary dressing as ordered. Applied multilayered dressing below the knee to right lower leg. Applied multilayered dressing below the knee to left lower leg. Instructed patient/caregiver not to remove dressing and to keep it clean and dry. Instructed patient/caregiver on complications to report to provider, such as pain, numbness in toes, heavy drainage, and slippage of dressing. Instructed patient on purpose of compression dressing and on activity and exercise recommendations.       Electronically signed by Daniela Crowell RN on 10/26/2023 at 9:51 AM

## 2023-10-26 NOTE — PLAN OF CARE
Problem: Cognitive:  Goal: Knowledge of wound care  Description: Knowledge of wound care  Outcome: Progressing  Goal: Understands risk factors for wounds  Description: Understands risk factors for wounds  Outcome: Progressing     Problem: Pressure Ulcer:  Goal: Signs of wound healing will improve  Description: Signs of wound healing will improve  Outcome: Progressing  Goal: Absence of new pressure ulcer  Description: Absence of new pressure ulcer  Outcome: Progressing  Goal: Will show no infection signs and symptoms  Description: Will show no infection signs and symptoms  Outcome: Progressing     Problem: Venous:  Goal: Signs of wound healing will improve  Description: Signs of wound healing will improve  Outcome: Progressing     Problem: Compression therapy:  Goal: Will be free from complications associated with compression therapy  Description: Will be free from complications associated with compression therapy  Outcome: Progressing     Problem: Weight control:  Goal: Ability to maintain an optimal weight for height and age will be supported  Description: Ability to maintain an optimal weight for height and age will be supported  Outcome: Progressing     Problem: Falls - Risk of:  Goal: Will remain free from falls  Description: Will remain free from falls  Outcome: Progressing     Problem: Blood Glucose:  Goal: Ability to maintain appropriate glucose levels will improve  Description: Ability to maintain appropriate glucose levels will improve  Outcome: Progressing

## 2023-10-28 LAB
MICROORGANISM SPEC CULT: ABNORMAL
MICROORGANISM/AGENT SPEC: ABNORMAL
MICROORGANISM/AGENT SPEC: ABNORMAL
SPECIMEN DESCRIPTION: ABNORMAL

## 2023-10-30 RX ORDER — SENNOSIDES 8.6 MG
650 CAPSULE ORAL EVERY 8 HOURS
COMMUNITY
Start: 2023-05-17

## 2023-10-30 RX ORDER — CEPHALEXIN 500 MG/1
1000 CAPSULE ORAL 2 TIMES DAILY
Qty: 40 CAPSULE | Refills: 0 | Status: SHIPPED | OUTPATIENT
Start: 2023-10-30 | End: 2023-11-09

## 2023-10-31 ENCOUNTER — HOSPITAL ENCOUNTER (OUTPATIENT)
Dept: WOUND CARE | Age: 73
Discharge: HOME | End: 2023-11-01
Attending: SURGERY
Payer: MEDICARE

## 2023-10-31 VITALS
HEIGHT: 62 IN | WEIGHT: 210 LBS | BODY MASS INDEX: 38.64 KG/M2 | TEMPERATURE: 97.9 F | HEART RATE: 90 BPM | DIASTOLIC BLOOD PRESSURE: 88 MMHG | RESPIRATION RATE: 16 BRPM | SYSTOLIC BLOOD PRESSURE: 134 MMHG

## 2023-10-31 PROCEDURE — 99213 OFFICE O/P EST LOW 20 MIN: CPT

## 2023-10-31 PROCEDURE — 29581 APPL MULTLAYER CMPRN SYS LEG: CPT

## 2023-10-31 ASSESSMENT — PAIN SCALES - GENERAL: PAINLEVEL_OUTOF10: 0

## 2023-10-31 NOTE — DISCHARGE INSTRUCTIONS
Today a compression dressing has been applied to your lower leg. Its purpose is to reduce swelling and help treat your wound. It will stay on until your next appointment. 1. It must stay dry. You can shower with a bag covering it or purchase a shower boot at  a medical supply store. 2. If the dressing falls more than 2 inches or gets wet, call us ([phone]) to come in  to have it changed. 3. If the top or bottom rolls, you can snip through it to relieve the constriction. 4. To help reduce swelling, when sitting elevate your leg, preferably to heart level. Avoid standing in one place for long periods of time. 5.  A rare complication is a decrease of arterial blood flow to your toes. If your   leg becomes more painful with numbness or tingling or a purple color to your toes,  carefully remove the dressing by cutting it off or unwrapping it. If normal sensation does not return to the limb, seek medical help. SIGNS OF INFECTION  - Redness, swelling, skin hot  - Wound bed turns black or stringy yellow  - Foul odor  - Increased drainage or pus  - Increased pain  - Fever greater than 100F    CALL YOUR DOCTOR OR SEEK MEDICAL ATTENTION IF SIGNS OF INFECTION.   DO NOT WAIT UNTIL YOUR NEXT APPOINTMENT    Call the Wound Care Nurse with any other questions or concerns- [phone]  Follow up as scheduled

## 2023-10-31 NOTE — PLAN OF CARE
Problem: Cognitive:  Goal: Knowledge of wound care  Description: Knowledge of wound care  Outcome: Completed  Goal: Understands risk factors for wounds  Description: Understands risk factors for wounds  Outcome: Completed     Problem: Wound:  Goal: Will show signs of wound healing; wound closure and no evidence of infection  Description: Will show signs of wound healing; wound closure and no evidence of infection  Outcome: Completed     Problem: Compression therapy:  Goal: Will be free from complications associated with compression therapy  Description: Will be free from complications associated with compression therapy  Outcome: Completed     Problem: Weight control:  Goal: Ability to maintain an optimal weight for height and age will be supported  Description: Ability to maintain an optimal weight for height and age will be supported  Outcome: Completed     Problem: Falls - Risk of:  Goal: Will remain free from falls  Description: Will remain free from falls  Outcome: Completed     Problem: Cognitive:  Goal: Knowledge of wound care  Description: Knowledge of wound care  Outcome: Progressing  Goal: Understands risk factors for wounds  Description: Understands risk factors for wounds  Outcome: Progressing     Problem: Wound:  Goal: Will show signs of wound healing; wound closure and no evidence of infection  Description: Will show signs of wound healing; wound closure and no evidence of infection  Outcome: Progressing     Problem: Venous:  Goal: Signs of wound healing will improve  Description: Signs of wound healing will improve  Outcome: Progressing     Problem: Weight control:  Goal: Ability to maintain an optimal weight for height and age will be supported  Description: Ability to maintain an optimal weight for height and age will be supported  Outcome: Progressing

## 2023-10-31 NOTE — PROGRESS NOTES
Patient presented for dressing change per order of Dr. Aneudy Miller. Old dressing removed. Periwound and ulcer(s) cleansed with normal saline. New dressing (collagen and 4 layer compression wrap) applied to right leg. Patient tolerated dressing change well. Wound Care Documentation:  Wound 10/03/23 Ankle Right; Inner (Active)   Wound Image   10/31/23 1350   Wound Etiology Venous 10/31/23 1350   Dressing Status Old drainage noted 10/31/23 1350   Wound Cleansed Cleansed with saline 10/31/23 1350   Dressing/Treatment Collagen 10/31/23 1350   Offloading for Diabetic Foot Ulcers Offloading not ordered 10/31/23 1350   Dressing Change Due 11/07/23 10/31/23 1350   Wound Length (cm) 2.5 cm 10/31/23 1350   Wound Width (cm) 1.1 cm 10/31/23 1350   Wound Depth (cm) 0.2 cm 10/31/23 1350   Wound Surface Area (cm^2) 2.75 cm^2 10/31/23 1350   Change in Wound Size % (l*w) 20.98 10/31/23 1350   Wound Volume (cm^3) 0.55 cm^3 10/31/23 1350   Wound Healing % 21 10/31/23 1350   Post-Procedure Length (cm) 2.6 cm 10/03/23 0944   Post-Procedure Width (cm) 1.4 cm 10/03/23 0944   Post-Procedure Depth (cm) 0.3 cm 10/03/23 0944   Post-Procedure Surface Area (cm^2) 3.64 cm^2 10/03/23 0944   Post-Procedure Volume (cm^3) 1.092 cm^3 10/03/23 0944   Wound Assessment Pink/red 10/31/23 1350   Drainage Amount Moderate (25-50%) 10/31/23 1350   Drainage Description Serosanguinous 10/31/23 1350   Odor Mild 10/31/23 1350   Pamela-wound Assessment Intact 10/31/23 1350   Margins Defined edges 10/31/23 1350   Wound Thickness Description not for Pressure Injury Full thickness 10/31/23 1350   Number of days: 28       Right ankle

## 2023-11-09 ENCOUNTER — HOSPITAL ENCOUNTER (OUTPATIENT)
Dept: WOUND CARE | Age: 73
Discharge: HOME | End: 2023-11-10
Attending: SURGERY
Payer: MEDICARE

## 2023-11-09 VITALS
DIASTOLIC BLOOD PRESSURE: 86 MMHG | WEIGHT: 208 LBS | HEIGHT: 62 IN | TEMPERATURE: 97.8 F | BODY MASS INDEX: 38.28 KG/M2 | RESPIRATION RATE: 16 BRPM | HEART RATE: 105 BPM | SYSTOLIC BLOOD PRESSURE: 126 MMHG

## 2023-11-09 DIAGNOSIS — I83.013 VENOUS STASIS ULCER OF ANKLE, RIGHT (HCC): Primary | ICD-10-CM

## 2023-11-09 DIAGNOSIS — L97.319 VENOUS STASIS ULCER OF ANKLE, RIGHT (HCC): Primary | ICD-10-CM

## 2023-11-09 PROCEDURE — 29581 APPL MULTLAYER CMPRN SYS LEG: CPT

## 2023-11-09 PROCEDURE — 11042 DBRDMT SUBQ TIS 1ST 20SQCM/<: CPT

## 2023-11-09 RX ORDER — LIDOCAINE 50 MG/G
OINTMENT TOPICAL ONCE
OUTPATIENT
Start: 2023-11-09 | End: 2023-11-09

## 2023-11-09 RX ORDER — BACITRACIN ZINC AND POLYMYXIN B SULFATE 500; 1000 [USP'U]/G; [USP'U]/G
OINTMENT TOPICAL ONCE
OUTPATIENT
Start: 2023-11-09 | End: 2023-11-09

## 2023-11-09 RX ORDER — LIDOCAINE HYDROCHLORIDE 20 MG/ML
JELLY TOPICAL ONCE
OUTPATIENT
Start: 2023-11-09 | End: 2023-11-09

## 2023-11-09 RX ORDER — LIDOCAINE 40 MG/G
CREAM TOPICAL ONCE
OUTPATIENT
Start: 2023-11-09 | End: 2023-11-09

## 2023-11-09 RX ORDER — BETAMETHASONE DIPROPIONATE 0.05 %
OINTMENT (GRAM) TOPICAL ONCE
OUTPATIENT
Start: 2023-11-09 | End: 2023-11-09

## 2023-11-09 RX ORDER — TRIAMCINOLONE ACETONIDE 1 MG/G
OINTMENT TOPICAL ONCE
OUTPATIENT
Start: 2023-11-09 | End: 2023-11-09

## 2023-11-09 RX ORDER — LIDOCAINE HYDROCHLORIDE 40 MG/ML
SOLUTION TOPICAL ONCE
OUTPATIENT
Start: 2023-11-09 | End: 2023-11-09

## 2023-11-09 RX ORDER — CLOBETASOL PROPIONATE 0.5 MG/G
OINTMENT TOPICAL ONCE
OUTPATIENT
Start: 2023-11-09 | End: 2023-11-09

## 2023-11-09 RX ORDER — SODIUM CHLOR/HYPOCHLOROUS ACID 0.033 %
SOLUTION, IRRIGATION IRRIGATION ONCE
OUTPATIENT
Start: 2023-11-09 | End: 2023-11-09

## 2023-11-09 RX ORDER — GENTAMICIN SULFATE 1 MG/G
OINTMENT TOPICAL ONCE
OUTPATIENT
Start: 2023-11-09 | End: 2023-11-09

## 2023-11-09 RX ORDER — GINSENG 100 MG
CAPSULE ORAL ONCE
OUTPATIENT
Start: 2023-11-09 | End: 2023-11-09

## 2023-11-09 RX ORDER — IBUPROFEN 200 MG
TABLET ORAL ONCE
OUTPATIENT
Start: 2023-11-09 | End: 2023-11-09

## 2023-11-09 NOTE — PROGRESS NOTES
PuraPly AMTreatment Note    NAME:  Lucrecia Culp  YOB: 1950  MEDICAL RECORD NUMBER:  [id number]  DATE:  11/9/2023    Goal:  Patient will receive safe and proper application of skin substitute. Patient will comply with caring for dressing, and reporting complications. Expiration date checked immediately prior to use. Package intact prior to use and no damage noted. PuraPly AM is stored at room temperature. PuraPly AM was removed from protective sterile packaging by provider and applied to prepared ulcer bed. PuraPly AM was hydrated with sterile normal saline per provider. PuraPly AM was applied to right ankle and affixed with steri-strips by the provider. PuraPly AM was covered with non-adherent ulcer dressing. Applied dry gauze and/or roll gauze. Patient/caregiver was instructed not to remove dressing and to keep it clean and dry. Pt/family/caregiver was instructed on signs and symptoms of complications to report such as draining through dressing, dressing falling down/slipping, getting wet, or severe pain or tingling.  Guidelines followed  PuraPly AM may only be used every 12 months per wound. Date of first application of PuraPly for this current wound is November 9, 2023.     Application # 1 out of 10   Puraply 2x2 applied to right ankle     Electronically signed by Edda Woo RN on 11/9/2023 at 10:10 AM
in face a little larger.  - Trying Puraply.   - Silver alginate  - 4 layer compression  - Follow up in 1 weeks

## 2023-11-14 ENCOUNTER — HOSPITAL ENCOUNTER (OUTPATIENT)
Dept: WOUND CARE | Age: 73
Discharge: HOME | End: 2023-11-15
Attending: SURGERY
Payer: MEDICARE

## 2023-11-14 VITALS
WEIGHT: 205 LBS | HEIGHT: 62 IN | TEMPERATURE: 98.5 F | DIASTOLIC BLOOD PRESSURE: 88 MMHG | RESPIRATION RATE: 18 BRPM | SYSTOLIC BLOOD PRESSURE: 124 MMHG | BODY MASS INDEX: 37.73 KG/M2 | HEART RATE: 92 BPM

## 2023-11-14 DIAGNOSIS — I83.013 VENOUS STASIS ULCER OF ANKLE, RIGHT (HCC): Primary | ICD-10-CM

## 2023-11-14 DIAGNOSIS — L97.319 VENOUS STASIS ULCER OF ANKLE, RIGHT (HCC): Primary | ICD-10-CM

## 2023-11-14 PROCEDURE — 11042 DBRDMT SUBQ TIS 1ST 20SQCM/<: CPT | Performed by: SURGERY

## 2023-11-14 PROCEDURE — 15271 SKIN SUB GRAFT TRNK/ARM/LEG: CPT | Performed by: SURGERY

## 2023-11-14 RX ORDER — BACITRACIN ZINC AND POLYMYXIN B SULFATE 500; 1000 [USP'U]/G; [USP'U]/G
OINTMENT TOPICAL ONCE
OUTPATIENT
Start: 2023-11-14 | End: 2023-11-14

## 2023-11-14 RX ORDER — LIDOCAINE HYDROCHLORIDE 40 MG/ML
SOLUTION TOPICAL ONCE
OUTPATIENT
Start: 2023-11-14 | End: 2023-11-14

## 2023-11-14 RX ORDER — LIDOCAINE HYDROCHLORIDE 20 MG/ML
JELLY TOPICAL ONCE
OUTPATIENT
Start: 2023-11-14 | End: 2023-11-14

## 2023-11-14 RX ORDER — GINSENG 100 MG
CAPSULE ORAL ONCE
OUTPATIENT
Start: 2023-11-14 | End: 2023-11-14

## 2023-11-14 RX ORDER — BETAMETHASONE DIPROPIONATE 0.05 %
OINTMENT (GRAM) TOPICAL ONCE
OUTPATIENT
Start: 2023-11-14 | End: 2023-11-14

## 2023-11-14 RX ORDER — TRIAMCINOLONE ACETONIDE 1 MG/G
OINTMENT TOPICAL ONCE
OUTPATIENT
Start: 2023-11-14 | End: 2023-11-14

## 2023-11-14 RX ORDER — GENTAMICIN SULFATE 1 MG/G
OINTMENT TOPICAL ONCE
OUTPATIENT
Start: 2023-11-14 | End: 2023-11-14

## 2023-11-14 RX ORDER — CLOBETASOL PROPIONATE 0.5 MG/G
OINTMENT TOPICAL ONCE
OUTPATIENT
Start: 2023-11-14 | End: 2023-11-14

## 2023-11-14 RX ORDER — LIDOCAINE 50 MG/G
OINTMENT TOPICAL ONCE
OUTPATIENT
Start: 2023-11-14 | End: 2023-11-14

## 2023-11-14 RX ORDER — IBUPROFEN 200 MG
TABLET ORAL ONCE
OUTPATIENT
Start: 2023-11-14 | End: 2023-11-14

## 2023-11-14 RX ORDER — SODIUM CHLOR/HYPOCHLOROUS ACID 0.033 %
SOLUTION, IRRIGATION IRRIGATION ONCE
OUTPATIENT
Start: 2023-11-14 | End: 2023-11-14

## 2023-11-14 RX ORDER — LIDOCAINE 40 MG/G
CREAM TOPICAL ONCE
OUTPATIENT
Start: 2023-11-14 | End: 2023-11-14

## 2023-11-14 ASSESSMENT — PAIN SCALES - GENERAL: PAINLEVEL_OUTOF10: 2

## 2023-11-14 NOTE — PLAN OF CARE
Problem: Cognitive:  Goal: Knowledge of wound care  Description: Knowledge of wound care  Outcome: Progressing  Goal: Understands risk factors for wounds  Description: Understands risk factors for wounds  Outcome: Progressing     Problem: Wound:  Goal: Will show signs of wound healing; wound closure and no evidence of infection  Description: Will show signs of wound healing; wound closure and no evidence of infection  Outcome: Progressing     Problem: Pressure Ulcer:  Goal: Signs of wound healing will improve  Description: Signs of wound healing will improve  Outcome: Progressing  Goal: Absence of new pressure ulcer  Description: Absence of new pressure ulcer  Outcome: Progressing  Goal: Will show no infection signs and symptoms  Description: Will show no infection signs and symptoms  Outcome: Progressing     Problem: Arterial:  Goal: Optimize blood flow for wound healing  Description: Optimize blood flow for wound healing  Outcome: Progressing     Problem: Venous:  Goal: Signs of wound healing will improve  Description: Signs of wound healing will improve  Outcome: Progressing     Problem: Compression therapy:  Goal: Will be free from complications associated with compression therapy  Description: Will be free from complications associated with compression therapy  Outcome: Progressing     Problem: Weight control:  Goal: Ability to maintain an optimal weight for height and age will be supported  Description: Ability to maintain an optimal weight for height and age will be supported  Outcome: Progressing     Problem: Falls - Risk of:  Goal: Will remain free from falls  Description: Will remain free from falls  Outcome: Progressing

## 2023-11-14 NOTE — PROGRESS NOTES
PuraPly AMTreatment Note    NAME:  Todd Banuelos  YOB: 1950  MEDICAL RECORD NUMBER:  [id number]  DATE:  11/14/2023    Goal:  Patient will receive safe and proper application of skin substitute. Patient will comply with caring for dressing, and reporting complications. Expiration date checked immediately prior to use. Package intact prior to use and no damage noted. PuraPly AM is stored at room temperature. PuraPly AM was removed from protective sterile packaging by provider and applied to prepared ulcer bed. PuraPly AM was hydrated with sterile normal saline per provider. PuraPly AM was applied to left ankle and affixed with steri-strips by the provider. Applied dry gauze and/or roll gauze. Patient/caregiver was instructed not to remove dressing and to keep it clean and dry. Pt/family/caregiver was instructed on signs and symptoms of complications to report such as draining through dressing, dressing falling down/slipping, getting wet, or severe pain or tingling.  Guidelines followed  PuraPly AM may only be used every 12 months per wound. Date of first application of PuraPly for this current wound is November 9, 2023.     Application # 2 out of 10  Applied 2x2 puraply to left ankle      Electronically signed by Ly Fischer RN on 11/14/2023 at 9:12 AM

## 2023-11-14 NOTE — DISCHARGE INSTRUCTIONS
Today a compression dressing has been applied to your lower leg. Its purpose is to reduce swelling and help treat your wound. It will stay on until your next appointment. 1. It must stay dry. You can shower with a bag covering it or purchase a shower boot at  a medical supply store. 2. If the dressing falls more than 2 inches or gets wet, call us ([phone]) to come in  to have it changed. 3. If the top or bottom rolls, you can snip through it to relieve the constriction. 4. To help reduce swelling, when sitting elevate your leg, preferably to heart level. Avoid standing in one place for long periods of time. 5.  A rare complication is a decrease of arterial blood flow to your toes. If your   leg becomes more painful with numbness or tingling or a purple color to your toes,  carefully remove the dressing by cutting it off or unwrapping it. If normal sensation does not return to the limb, seek medical help. Follow up as scheduled    SIGNS OF INFECTION  - Redness, swelling, skin hot  - Wound bed turns black or stringy yellow  - Foul odor  - Increased drainage or pus  - Increased pain  - Fever greater than 100F    CALL YOUR DOCTOR OR SEEK MEDICAL ATTENTION IF SIGNS OF INFECTION.   DO NOT WAIT UNTIL YOUR NEXT APPOINTMENT    Call the Wound Care Nurse with any other questions or concerns- [phone]

## 2023-11-21 ENCOUNTER — HOSPITAL ENCOUNTER (OUTPATIENT)
Dept: WOUND CARE | Age: 73
Discharge: HOME | End: 2023-11-22
Attending: SURGERY
Payer: MEDICARE

## 2023-11-21 VITALS
HEIGHT: 62 IN | TEMPERATURE: 97.8 F | DIASTOLIC BLOOD PRESSURE: 98 MMHG | HEART RATE: 96 BPM | WEIGHT: 208 LBS | SYSTOLIC BLOOD PRESSURE: 162 MMHG | RESPIRATION RATE: 18 BRPM | BODY MASS INDEX: 38.28 KG/M2

## 2023-11-21 DIAGNOSIS — L97.319 VENOUS STASIS ULCER OF ANKLE, RIGHT (HCC): Primary | ICD-10-CM

## 2023-11-21 DIAGNOSIS — I83.013 VENOUS STASIS ULCER OF ANKLE, RIGHT (HCC): Primary | ICD-10-CM

## 2023-11-21 PROCEDURE — 15271 SKIN SUB GRAFT TRNK/ARM/LEG: CPT

## 2023-11-21 PROCEDURE — 11042 DBRDMT SUBQ TIS 1ST 20SQCM/<: CPT

## 2023-11-21 RX ORDER — GENTAMICIN SULFATE 1 MG/G
OINTMENT TOPICAL ONCE
OUTPATIENT
Start: 2023-11-21 | End: 2023-11-21

## 2023-11-21 RX ORDER — BETAMETHASONE DIPROPIONATE 0.05 %
OINTMENT (GRAM) TOPICAL ONCE
OUTPATIENT
Start: 2023-11-21 | End: 2023-11-21

## 2023-11-21 RX ORDER — IBUPROFEN 200 MG
TABLET ORAL ONCE
OUTPATIENT
Start: 2023-11-21 | End: 2023-11-21

## 2023-11-21 RX ORDER — LIDOCAINE 40 MG/G
CREAM TOPICAL ONCE
OUTPATIENT
Start: 2023-11-21 | End: 2023-11-21

## 2023-11-21 RX ORDER — BACITRACIN ZINC AND POLYMYXIN B SULFATE 500; 1000 [USP'U]/G; [USP'U]/G
OINTMENT TOPICAL ONCE
OUTPATIENT
Start: 2023-11-21 | End: 2023-11-21

## 2023-11-21 RX ORDER — SODIUM CHLOR/HYPOCHLOROUS ACID 0.033 %
SOLUTION, IRRIGATION IRRIGATION ONCE
OUTPATIENT
Start: 2023-11-21 | End: 2023-11-21

## 2023-11-21 RX ORDER — LIDOCAINE 50 MG/G
OINTMENT TOPICAL ONCE
OUTPATIENT
Start: 2023-11-21 | End: 2023-11-21

## 2023-11-21 RX ORDER — CLOBETASOL PROPIONATE 0.5 MG/G
OINTMENT TOPICAL ONCE
OUTPATIENT
Start: 2023-11-21 | End: 2023-11-21

## 2023-11-21 RX ORDER — GINSENG 100 MG
CAPSULE ORAL ONCE
OUTPATIENT
Start: 2023-11-21 | End: 2023-11-21

## 2023-11-21 RX ORDER — LIDOCAINE HYDROCHLORIDE 20 MG/ML
JELLY TOPICAL ONCE
OUTPATIENT
Start: 2023-11-21 | End: 2023-11-21

## 2023-11-21 RX ORDER — LIDOCAINE HYDROCHLORIDE 40 MG/ML
SOLUTION TOPICAL ONCE
OUTPATIENT
Start: 2023-11-21 | End: 2023-11-21

## 2023-11-21 RX ORDER — TRIAMCINOLONE ACETONIDE 1 MG/G
OINTMENT TOPICAL ONCE
OUTPATIENT
Start: 2023-11-21 | End: 2023-11-21

## 2023-11-21 ASSESSMENT — PAIN SCALES - GENERAL: PAINLEVEL_OUTOF10: 0

## 2023-11-21 NOTE — DISCHARGE INSTRUCTIONS
Puraply applied to right ankle. Today a compression dressing has been applied to your lower leg. Its purpose is to reduce swelling and help treat your wound. It will stay on until your next appointment. 1. It must stay dry. You can shower with a bag covering it or purchase a shower boot at  a medical supply store. 2. If the dressing falls more than 2 inches or gets wet, call us ([phone]) to come in  to have it changed. 3. If the top or bottom rolls, you can snip through it to relieve the constriction. 4. To help reduce swelling, when sitting elevate your leg, preferably to heart level. Avoid standing in one place for long periods of time. 5.  A rare complication is a decrease of arterial blood flow to your toes. If your   leg becomes more painful with numbness or tingling or a purple color to your toes,  carefully remove the dressing by cutting it off or unwrapping it. If normal sensation does not return to the limb, seek medical help.      Follow up as scheduled

## 2023-11-21 NOTE — PLAN OF CARE
Problem: Cognitive:  Goal: Knowledge of wound care  Description: Knowledge of wound care  Outcome: Progressing  Goal: Understands risk factors for wounds  Description: Understands risk factors for wounds  Outcome: Progressing     Problem: Wound:  Goal: Will show signs of wound healing; wound closure and no evidence of infection  Description: Will show signs of wound healing; wound closure and no evidence of infection  Outcome: Progressing     Problem: Pressure Ulcer:  Goal: Signs of wound healing will improve  Description: Signs of wound healing will improve  Outcome: Progressing  Goal: Absence of new pressure ulcer  Description: Absence of new pressure ulcer  Outcome: Progressing  Goal: Will show no infection signs and symptoms  Description: Will show no infection signs and symptoms  Outcome: Progressing     Problem: Venous:  Goal: Signs of wound healing will improve  Description: Signs of wound healing will improve  Outcome: Progressing     Problem: Compression therapy:  Goal: Will be free from complications associated with compression therapy  Description: Will be free from complications associated with compression therapy  Outcome: Progressing     Problem: Weight control:  Goal: Ability to maintain an optimal weight for height and age will be supported  Description: Ability to maintain an optimal weight for height and age will be supported  Outcome: Progressing     Problem: Falls - Risk of:  Goal: Will remain free from falls  Description: Will remain free from falls  Outcome: Progressing     Problem: Blood Glucose:  Goal: Ability to maintain appropriate glucose levels will improve  Description: Ability to maintain appropriate glucose levels will improve  Outcome: Progressing

## 2023-11-21 NOTE — PROGRESS NOTES
PuraPly AMTreatment Note    NAME:  Ion Granados  YOB: 1950  MEDICAL RECORD NUMBER:  [id number]  DATE:  11/21/2023    Goal:  Patient will receive safe and proper application of skin substitute. Patient will comply with caring for dressing, and reporting complications. Expiration date checked immediately prior to use. Package intact prior to use and no damage noted. PuraPly AM is stored at room temperature. PuraPly AM was removed from protective sterile packaging by provider and applied to prepared ulcer bed. PuraPly AM was hydrated with sterile normal saline per provider. PuraPly AM was applied to right ankle and affixed with steri-strips by the provider. PuraPly AM was covered with non-adherent ulcer dressing. Applied dry gauze and/or roll gauze. Patient/caregiver was instructed not to remove dressing and to keep it clean and dry. Pt/family/caregiver was instructed on signs and symptoms of complications to report such as draining through dressing, dressing falling down/slipping, getting wet, or severe pain or tingling.  Guidelines followed  PuraPly AM may only be used every 12 months per wound. Date of first application of PuraPly for this current wound is November 9, 2023.     Application # 3 out of 10   Applied 2x2 puraply to right ankle     Electronically signed by Marylen Burden, RN on 11/21/2023 at 10:17 AM

## 2023-11-28 ENCOUNTER — HOSPITAL ENCOUNTER (OUTPATIENT)
Dept: WOUND CARE | Age: 73
Discharge: HOME | End: 2023-11-29
Attending: SURGERY
Payer: MEDICARE

## 2023-11-28 VITALS
HEIGHT: 62 IN | HEART RATE: 82 BPM | TEMPERATURE: 98.3 F | DIASTOLIC BLOOD PRESSURE: 88 MMHG | BODY MASS INDEX: 38.28 KG/M2 | SYSTOLIC BLOOD PRESSURE: 138 MMHG | WEIGHT: 208 LBS | RESPIRATION RATE: 18 BRPM

## 2023-11-28 PROCEDURE — 99213 OFFICE O/P EST LOW 20 MIN: CPT

## 2023-11-28 PROCEDURE — 29581 APPL MULTLAYER CMPRN SYS LEG: CPT

## 2023-11-28 NOTE — PROCEDURES
Patient presented for dressing change per order of Dr. Jes Giraldo. Old dressing removed. Periwound and ulcer(s) cleansed. Epific intact. New 4 layer compression dressing applied to right leg. Patient tolerated dressing change well. Wound Care Documentation:  Wound 10/03/23 Ankle Right; Inner (Active)   Wound Image   11/09/23 0942   Wound Etiology Venous 11/21/23 0959   Dressing Status Old drainage noted 11/21/23 0959   Wound Cleansed Cleansed with saline 11/21/23 0959   Dressing/Treatment Collagen 11/09/23 0931   Offloading for Diabetic Foot Ulcers Offloading not ordered 11/21/23 0959   Dressing Change Due 11/22/23 11/21/23 0959   Wound Length (cm) 1.5 cm 11/21/23 0959   Wound Width (cm) 0.7 cm 11/21/23 0959   Wound Depth (cm) 0.2 cm 11/21/23 0959   Wound Surface Area (cm^2) 1.05 cm^2 11/21/23 0959   Change in Wound Size % (l*w) 69.83 11/21/23 0959   Wound Volume (cm^3) 0.21 cm^3 11/21/23 0959   Wound Healing % 70 11/21/23 0959   Post-Procedure Length (cm) 2 cm 11/21/23 0959   Post-Procedure Width (cm) 0.9 cm 11/21/23 0959   Post-Procedure Depth (cm) 0.3 cm 11/21/23 0959   Post-Procedure Surface Area (cm^2) 1.8 cm^2 11/21/23 0959   Post-Procedure Volume (cm^3) 0.54 cm^3 11/21/23 0959   Wound Assessment Pink/red 11/21/23 0959   Drainage Amount Moderate (25-50%) 11/21/23 0959   Drainage Description Serosanguinous 11/21/23 0959   Odor None 11/21/23 0959   Pamela-wound Assessment Intact 11/21/23 0959   Margins Defined edges 11/21/23 0959   Wound Thickness Description not for Pressure Injury Full thickness 11/21/23 0959   Number of days: 56

## 2023-12-05 ENCOUNTER — HOSPITAL ENCOUNTER (OUTPATIENT)
Dept: WOUND CARE | Age: 73
Discharge: HOME | End: 2023-12-06
Attending: SURGERY
Payer: MEDICARE

## 2023-12-05 VITALS
RESPIRATION RATE: 18 BRPM | SYSTOLIC BLOOD PRESSURE: 136 MMHG | HEIGHT: 62 IN | HEART RATE: 90 BPM | WEIGHT: 206.7 LBS | BODY MASS INDEX: 38.04 KG/M2 | TEMPERATURE: 98.4 F | DIASTOLIC BLOOD PRESSURE: 84 MMHG

## 2023-12-05 PROCEDURE — 99213 OFFICE O/P EST LOW 20 MIN: CPT

## 2023-12-05 PROCEDURE — 29581 APPL MULTLAYER CMPRN SYS LEG: CPT

## 2023-12-05 NOTE — PLAN OF CARE
Problem: Pain  Goal: Verbalizes/displays adequate comfort level or baseline comfort level  Outcome: Progressing  Flowsheets (Taken 12/5/2023 4957)  Verbalizes/displays adequate comfort level or baseline comfort level: Assess pain using appropriate pain scale     Problem: Cognitive:  Goal: Knowledge of wound care  Description: Knowledge of wound care  Outcome: Progressing  Goal: Understands risk factors for wounds  Description: Understands risk factors for wounds  Outcome: Progressing     Problem: Wound:  Goal: Will show signs of wound healing; wound closure and no evidence of infection  Description: Will show signs of wound healing; wound closure and no evidence of infection  Outcome: Progressing     Problem: Venous:  Goal: Signs of wound healing will improve  Description: Signs of wound healing will improve  Outcome: Progressing     Problem: Compression therapy:  Goal: Will be free from complications associated with compression therapy  Description: Will be free from complications associated with compression therapy  Outcome: Progressing     Problem: Weight control:  Goal: Ability to maintain an optimal weight for height and age will be supported  Description: Ability to maintain an optimal weight for height and age will be supported  Outcome: Progressing     Problem: Falls - Risk of:  Goal: Will remain free from falls  Description: Will remain free from falls  Outcome: Progressing

## 2023-12-05 NOTE — PROGRESS NOTES
Patient presented for dressing change per order of Dr. Janet Vega. Old dressing removed. Periwound and ulcer(s) cleansed with normal saline. New dressing (silver alginate and 4 layer compression wrap) applied to right leg. Patient tolerated dressing change well. Patient next OV with Dr. Tyler Whipple 12/13/23. Wound Care Documentation:  Wound 10/03/23 Ankle Right; Inner (Active)   Wound Image   11/09/23 0942   Wound Etiology Venous 12/05/23 0948   Dressing Status Old drainage noted 12/05/23 0948   Wound Cleansed Cleansed with saline 12/05/23 0948   Dressing/Treatment Alginate with Ag 12/05/23 0948   Offloading for Diabetic Foot Ulcers Offloading not ordered 12/05/23 0948   Dressing Change Due 12/12/23 12/05/23 0948   Wound Length (cm) 1 cm 12/05/23 0948   Wound Width (cm) 0.8 cm 12/05/23 0948   Wound Depth (cm) 0.2 cm 12/05/23 0948   Wound Surface Area (cm^2) 0.8 cm^2 12/05/23 0948   Change in Wound Size % (l*w) 77.01 12/05/23 0948   Wound Volume (cm^3) 0.16 cm^3 12/05/23 0948   Wound Healing % 77 12/05/23 0948   Post-Procedure Length (cm) 2 cm 11/21/23 0959   Post-Procedure Width (cm) 0.9 cm 11/21/23 0959   Post-Procedure Depth (cm) 0.3 cm 11/21/23 0959   Post-Procedure Surface Area (cm^2) 1.8 cm^2 11/21/23 0959   Post-Procedure Volume (cm^3) 0.54 cm^3 11/21/23 0959   Wound Assessment Pink/red 12/05/23 0948   Drainage Amount Moderate (25-50%) 12/05/23 0948   Drainage Description Serosanguinous 12/05/23 0948   Odor None 12/05/23 0948   Pamela-wound Assessment Intact 12/05/23 0948   Margins Defined edges 12/05/23 0948   Wound Thickness Description not for Pressure Injury Full thickness 12/05/23 0948   Number of days: 63     Multilayer Compression Wrap   (Not Unna) Below the Knee    NAME:  Brenda Schwarz  YOB: 1950  MEDICAL RECORD NUMBER:  [id number]  DATE:  12/5/2023    Multilayer compression wrap: Removed old Multilayer wrap if indicated and wash leg with mild soap/water.   Applied moisturizing agent to dry

## 2023-12-13 ENCOUNTER — HOSPITAL ENCOUNTER (OUTPATIENT)
Dept: WOUND CARE | Age: 73
Discharge: HOME | End: 2023-12-14
Attending: SURGERY
Payer: MEDICARE

## 2023-12-13 VITALS
TEMPERATURE: 99.1 F | HEART RATE: 88 BPM | HEIGHT: 62 IN | RESPIRATION RATE: 18 BRPM | DIASTOLIC BLOOD PRESSURE: 78 MMHG | BODY MASS INDEX: 39.01 KG/M2 | WEIGHT: 212 LBS | SYSTOLIC BLOOD PRESSURE: 126 MMHG

## 2023-12-13 DIAGNOSIS — I87.2 VENOUS STASIS DERMATITIS OF RIGHT LOWER EXTREMITY: ICD-10-CM

## 2023-12-13 DIAGNOSIS — I83.013 VENOUS STASIS ULCER OF ANKLE, RIGHT (HCC): Primary | ICD-10-CM

## 2023-12-13 DIAGNOSIS — L97.319 VENOUS STASIS ULCER OF ANKLE, RIGHT (HCC): Primary | ICD-10-CM

## 2023-12-13 DIAGNOSIS — M21.961 FOOT DEFORMITY, BILATERAL: ICD-10-CM

## 2023-12-13 DIAGNOSIS — M21.962 FOOT DEFORMITY, BILATERAL: ICD-10-CM

## 2023-12-13 PROCEDURE — 99213 OFFICE O/P EST LOW 20 MIN: CPT | Performed by: SURGERY

## 2023-12-13 PROCEDURE — 29581 APPL MULTLAYER CMPRN SYS LEG: CPT

## 2023-12-13 PROCEDURE — 11042 DBRDMT SUBQ TIS 1ST 20SQCM/<: CPT

## 2023-12-13 PROCEDURE — 11042 DBRDMT SUBQ TIS 1ST 20SQCM/<: CPT | Performed by: SURGERY

## 2023-12-13 RX ORDER — GINSENG 100 MG
CAPSULE ORAL ONCE
OUTPATIENT
Start: 2023-12-13 | End: 2023-12-13

## 2023-12-13 RX ORDER — BACITRACIN ZINC AND POLYMYXIN B SULFATE 500; 1000 [USP'U]/G; [USP'U]/G
OINTMENT TOPICAL ONCE
OUTPATIENT
Start: 2023-12-13 | End: 2023-12-13

## 2023-12-13 RX ORDER — LIDOCAINE HYDROCHLORIDE 20 MG/ML
JELLY TOPICAL ONCE
OUTPATIENT
Start: 2023-12-13 | End: 2023-12-13

## 2023-12-13 RX ORDER — BETAMETHASONE DIPROPIONATE 0.5 MG/G
CREAM TOPICAL ONCE
OUTPATIENT
Start: 2023-12-13 | End: 2023-12-13

## 2023-12-13 RX ORDER — GENTAMICIN SULFATE 1 MG/G
OINTMENT TOPICAL ONCE
OUTPATIENT
Start: 2023-12-13 | End: 2023-12-13

## 2023-12-13 RX ORDER — CLOBETASOL PROPIONATE 0.5 MG/G
OINTMENT TOPICAL ONCE
OUTPATIENT
Start: 2023-12-13 | End: 2023-12-13

## 2023-12-13 RX ORDER — TRIAMCINOLONE ACETONIDE 1 MG/G
OINTMENT TOPICAL ONCE
OUTPATIENT
Start: 2023-12-13 | End: 2023-12-13

## 2023-12-13 RX ORDER — SODIUM CHLOR/HYPOCHLOROUS ACID 0.033 %
SOLUTION, IRRIGATION IRRIGATION ONCE
OUTPATIENT
Start: 2023-12-13 | End: 2023-12-13

## 2023-12-13 RX ORDER — LIDOCAINE 50 MG/G
OINTMENT TOPICAL ONCE
OUTPATIENT
Start: 2023-12-13 | End: 2023-12-13

## 2023-12-13 RX ORDER — LIDOCAINE HYDROCHLORIDE 40 MG/ML
SOLUTION TOPICAL ONCE
OUTPATIENT
Start: 2023-12-13 | End: 2023-12-13

## 2023-12-13 RX ORDER — LIDOCAINE 40 MG/G
CREAM TOPICAL ONCE
OUTPATIENT
Start: 2023-12-13 | End: 2023-12-13

## 2023-12-13 RX ORDER — IBUPROFEN 200 MG
TABLET ORAL ONCE
OUTPATIENT
Start: 2023-12-13 | End: 2023-12-13

## 2023-12-13 ASSESSMENT — PAIN SCALES - GENERAL: PAINLEVEL_OUTOF10: 0

## 2023-12-13 NOTE — DISCHARGE INSTRUCTIONS
Collagen applied to wound bed today. Continue 4 layer compression wrap to right leg. Change dressing in a week. SIGNS OF INFECTION  - Redness, swelling, skin hot  - Wound bed turns black or stringy yellow  - Foul odor  - Increased drainage or pus  - Increased pain  - Fever greater than 100F    CALL YOUR DOCTOR OR SEEK MEDICAL ATTENTION IF SIGNS OF INFECTION.   DO NOT WAIT UNTIL YOUR NEXT APPOINTMENT    Call the Wound Care Nurse with any other questions or concerns- [phone]  Follow up as scheduled

## 2023-12-13 NOTE — ADDENDUM NOTE
Encounter addended by: Nakita Minaya RN on: 12/13/2023 3:56 PM   Actions taken: Charge Capture section accepted

## 2023-12-13 NOTE — PROGRESS NOTES
Subjective   Shelley Guzman is a 68 y.o. female who presents today for Follow-up of a chronic venous stasis ulcer of the little right foot. Patient has been following with Dr. Joy Betancourt for some time and had a fairly complex venous stasis ulcer of the medial right foot. She was treated successfully and had healing but has recently had recurrence of the wound and is continuing to follow for recurrence of the wound. She has been most recently been using PuraPly to the wound with 4 layer compression. His last being seen denies any new issues. Wound is smaller. No new complaints. Past Medical History:   Diagnosis Date    DVT (deep venous thrombosis) (HCC)     RLE    Gilbert's syndrome     Osteoarthritis        Past Surgical History:   Procedure Laterality Date    COLONOSCOPY  2012    HYSTERECTOMY, TOTAL ABDOMINAL (CERVIX REMOVED)  1999       Current Outpatient Medications   Medication Sig Dispense Refill    oxybutynin (DITROPAN) 5 MG tablet take 1 tablet by mouth every 12 hours for URINARY SYMPTOMS      celecoxib (CELEBREX) 200 MG capsule take 1 capsule by mouth once daily      atorvastatin (LIPITOR) 20 MG tablet Take 1 tablet by mouth daily      calcium carbonate 600 MG TABS tablet Take 1 tablet by mouth daily      vitamin D (CHOLECALCIFEROL) 25 MCG (1000 UT) TABS tablet Take 1 tablet by mouth daily Takes 4x/day. Per pharmacy as directed      warfarin (COUMADIN) 5 MG tablet Take 1 tablet by mouth Takes 1-1.5 tabs (5-7.5 mg total) by mouth daily. No current facility-administered medications for this encounter.        No Known Allergies    Family History   Problem Relation Age of Onset    Diabetes Mother     Coronary Art Dis Mother     Arthritis Mother     High Blood Pressure Mother     Cancer Father        Social History     Socioeconomic History    Marital status:      Spouse name: Not on file    Number of children: Not on file    Years of education: Not on file    Highest education level: Not on

## 2023-12-13 NOTE — PLAN OF CARE
Problem: Cognitive:  Goal: Knowledge of wound care  Description: Knowledge of wound care  Outcome: Progressing  Goal: Understands risk factors for wounds  Description: Understands risk factors for wounds  Outcome: Progressing     Problem: Venous:  Goal: Signs of wound healing will improve  Description: Signs of wound healing will improve  Outcome: Progressing     Problem: Smoking cessation:  Goal: Ability to formulate a plan to maintain a tobacco-free life will be supported  Description: Ability to formulate a plan to maintain a tobacco-free life will be supported  Outcome: Progressing     Problem: Compression therapy:  Goal: Will be free from complications associated with compression therapy  Description: Will be free from complications associated with compression therapy  Outcome: Progressing     Problem: Weight control:  Goal: Ability to maintain an optimal weight for height and age will be supported  Description: Ability to maintain an optimal weight for height and age will be supported  Outcome: Progressing     Problem: Falls - Risk of:  Goal: Will remain free from falls  Description: Will remain free from falls  Outcome: Progressing

## 2023-12-27 ENCOUNTER — HOSPITAL ENCOUNTER (OUTPATIENT)
Dept: WOUND CARE | Age: 73
Discharge: HOME | End: 2023-12-28
Attending: SURGERY
Payer: MEDICARE

## 2023-12-27 VITALS
WEIGHT: 212.6 LBS | HEART RATE: 92 BPM | RESPIRATION RATE: 20 BRPM | SYSTOLIC BLOOD PRESSURE: 152 MMHG | BODY MASS INDEX: 39.12 KG/M2 | DIASTOLIC BLOOD PRESSURE: 92 MMHG | TEMPERATURE: 98.4 F | HEIGHT: 62 IN

## 2023-12-27 DIAGNOSIS — I87.2 VENOUS STASIS DERMATITIS OF RIGHT LOWER EXTREMITY: ICD-10-CM

## 2023-12-27 DIAGNOSIS — L97.319 VENOUS STASIS ULCER OF ANKLE, RIGHT (HCC): Primary | ICD-10-CM

## 2023-12-27 DIAGNOSIS — I83.013 VENOUS STASIS ULCER OF ANKLE, RIGHT (HCC): Primary | ICD-10-CM

## 2023-12-27 PROCEDURE — 99213 OFFICE O/P EST LOW 20 MIN: CPT

## 2023-12-27 PROCEDURE — 29581 APPL MULTLAYER CMPRN SYS LEG: CPT

## 2023-12-27 PROCEDURE — 99213 OFFICE O/P EST LOW 20 MIN: CPT | Performed by: SURGERY

## 2023-12-27 RX ORDER — LIDOCAINE 40 MG/G
CREAM TOPICAL ONCE
OUTPATIENT
Start: 2023-12-27 | End: 2023-12-27

## 2023-12-27 RX ORDER — LIDOCAINE HYDROCHLORIDE 20 MG/ML
JELLY TOPICAL ONCE
OUTPATIENT
Start: 2023-12-27 | End: 2023-12-27

## 2023-12-27 RX ORDER — GENTAMICIN SULFATE 1 MG/G
OINTMENT TOPICAL ONCE
OUTPATIENT
Start: 2023-12-27 | End: 2023-12-27

## 2023-12-27 RX ORDER — BETAMETHASONE DIPROPIONATE 0.5 MG/G
CREAM TOPICAL ONCE
OUTPATIENT
Start: 2023-12-27 | End: 2023-12-27

## 2023-12-27 RX ORDER — LIDOCAINE HYDROCHLORIDE 40 MG/ML
SOLUTION TOPICAL ONCE
OUTPATIENT
Start: 2023-12-27 | End: 2023-12-27

## 2023-12-27 RX ORDER — LIDOCAINE 50 MG/G
OINTMENT TOPICAL ONCE
OUTPATIENT
Start: 2023-12-27 | End: 2023-12-27

## 2023-12-27 RX ORDER — BACITRACIN ZINC AND POLYMYXIN B SULFATE 500; 1000 [USP'U]/G; [USP'U]/G
OINTMENT TOPICAL ONCE
OUTPATIENT
Start: 2023-12-27 | End: 2023-12-27

## 2023-12-27 RX ORDER — IBUPROFEN 200 MG
TABLET ORAL ONCE
OUTPATIENT
Start: 2023-12-27 | End: 2023-12-27

## 2023-12-27 RX ORDER — SODIUM CHLOR/HYPOCHLOROUS ACID 0.033 %
SOLUTION, IRRIGATION IRRIGATION ONCE
OUTPATIENT
Start: 2023-12-27 | End: 2023-12-27

## 2023-12-27 RX ORDER — GINSENG 100 MG
CAPSULE ORAL ONCE
OUTPATIENT
Start: 2023-12-27 | End: 2023-12-27

## 2023-12-27 RX ORDER — TRIAMCINOLONE ACETONIDE 1 MG/G
OINTMENT TOPICAL ONCE
OUTPATIENT
Start: 2023-12-27 | End: 2023-12-27

## 2023-12-27 RX ORDER — CLOBETASOL PROPIONATE 0.5 MG/G
OINTMENT TOPICAL ONCE
OUTPATIENT
Start: 2023-12-27 | End: 2023-12-27

## 2023-12-27 NOTE — PLAN OF CARE
Problem: Cognitive:  Goal: Knowledge of wound care  Description: Knowledge of wound care  Outcome: Progressing  Goal: Understands risk factors for wounds  Description: Understands risk factors for wounds  Outcome: Progressing     Problem: Wound:  Goal: Will show signs of wound healing; wound closure and no evidence of infection  Description: Will show signs of wound healing; wound closure and no evidence of infection  Outcome: Progressing     Problem: Compression therapy:  Goal: Will be free from complications associated with compression therapy  Description: Will be free from complications associated with compression therapy  Outcome: Progressing     Problem: Falls - Risk of:  Goal: Will remain free from falls  Description: Will remain free from falls  Outcome: Progressing     Problem: Blood Glucose:  Goal: Ability to maintain appropriate glucose levels will improve  Description: Ability to maintain appropriate glucose levels will improve  Outcome: Progressing

## 2023-12-27 NOTE — PROGRESS NOTES
Subjective   Shania Stein is a 68 y.o. female who presents today for follow-up of right ankle wound. Patient was transitioned to me from Dr. Laura Ghosh. When I saw her last there was some fibrinous exudate which was debrided away and we have been doing a collagen dressing to the area. Also using 4-layer compression wraps. Since last being seen she denies any new problems. Seems that the wound was scabbed over now. No new complaints. Past Medical History:   Diagnosis Date    DVT (deep venous thrombosis) (HCC)     RLE    Gilbert's syndrome     Osteoarthritis        Past Surgical History:   Procedure Laterality Date    COLONOSCOPY  2012    HYSTERECTOMY, TOTAL ABDOMINAL (CERVIX REMOVED)  1999       Current Outpatient Medications   Medication Sig Dispense Refill    oxybutynin (DITROPAN) 5 MG tablet take 1 tablet by mouth every 12 hours for URINARY SYMPTOMS      celecoxib (CELEBREX) 200 MG capsule take 1 capsule by mouth once daily      atorvastatin (LIPITOR) 20 MG tablet Take 1 tablet by mouth daily      calcium carbonate 600 MG TABS tablet Take 1 tablet by mouth daily      vitamin D (CHOLECALCIFEROL) 25 MCG (1000 UT) TABS tablet Take 1 tablet by mouth daily Takes 4x/day. Per pharmacy as directed      warfarin (COUMADIN) 5 MG tablet Take 1 tablet by mouth Takes 1-1.5 tabs (5-7.5 mg total) by mouth daily. No current facility-administered medications for this encounter.        No Known Allergies    Family History   Problem Relation Age of Onset    Diabetes Mother     Coronary Art Dis Mother     Arthritis Mother     High Blood Pressure Mother     Cancer Father        Social History     Socioeconomic History    Marital status:      Spouse name: Not on file    Number of children: Not on file    Years of education: Not on file    Highest education level: Not on file   Occupational History    Not on file   Tobacco Use    Smoking status: Never    Smokeless tobacco: Never   Vaping Use    Vaping Use: Never

## 2023-12-27 NOTE — DISCHARGE INSTRUCTIONS
Follow up as scheduled for nurse visits and follow up with Dr. Rosendo Edwardsly in wound care. Today a compression dressing has been applied to your lower leg. Its purpose is to reduce swelling and help treat your wound. It will stay on until your next appointment. 1. It must stay dry. You can shower with a bag covering it or purchase a shower boot at  a medical supply store. 2. If the dressing falls more than 2 inches or gets wet, call us ([phone]) to come in  to have it changed. 3. If the top or bottom rolls, you can snip through it to relieve the constriction. 4. To help reduce swelling, when sitting elevate your leg, preferably to heart level. Avoid standing in one place for long periods of time. 5.  A rare complication is a decrease of arterial blood flow to your toes. If your   leg becomes more painful with numbness or tingling or a purple color to your toes,  carefully remove the dressing by cutting it off or unwrapping it. If normal sensation does not return to the limb, seek medical help.

## 2024-01-03 ENCOUNTER — HOSPITAL ENCOUNTER (OUTPATIENT)
Dept: WOUND CARE | Age: 74
Discharge: HOME | End: 2024-01-04
Attending: SURGERY
Payer: MEDICARE

## 2024-01-03 ENCOUNTER — TELEPHONE (OUTPATIENT)
Dept: SURGERY | Age: 74
End: 2024-01-03

## 2024-01-03 VITALS
TEMPERATURE: 98.3 F | WEIGHT: 213 LBS | HEIGHT: 62 IN | DIASTOLIC BLOOD PRESSURE: 88 MMHG | SYSTOLIC BLOOD PRESSURE: 136 MMHG | HEART RATE: 88 BPM | RESPIRATION RATE: 20 BRPM | BODY MASS INDEX: 39.2 KG/M2

## 2024-01-03 PROCEDURE — 29581 APPL MULTLAYER CMPRN SYS LEG: CPT

## 2024-01-03 PROCEDURE — 99213 OFFICE O/P EST LOW 20 MIN: CPT

## 2024-01-03 ASSESSMENT — PAIN SCALES - GENERAL: PAINLEVEL_OUTOF10: 0

## 2024-01-03 NOTE — PROGRESS NOTES
Patient presented for dressing change per order of Dr. Capone. Old dressing removed. Periwound and ulcer(s) cleansed with normal saline. Right leg washed with soap and water, pat dry.  New dressing applied to right leg.   Wound Care Documentation:  Wound 10/03/23 Ankle Right;Inner (Active)   Wound Image   01/03/24 0932   Wound Etiology Venous 01/03/24 0932   Dressing Status Old drainage noted;Dry 01/03/24 0932   Wound Cleansed Cleansed with saline 01/03/24 0932   Dressing/Treatment Foam 01/03/24 0932   Offloading for Diabetic Foot Ulcers Offloading not ordered 01/03/24 0932   Dressing Change Due 01/10/24 01/03/24 0932   Wound Length (cm) 0.1 cm 01/03/24 0932   Wound Width (cm) 0.1 cm 01/03/24 0932   Wound Depth (cm) 0.1 cm 01/03/24 0932   Wound Surface Area (cm^2) 0.01 cm^2 01/03/24 0932   Change in Wound Size % (l*w) 99.71 01/03/24 0932   Wound Volume (cm^3) 0.001 cm^3 01/03/24 0932   Wound Healing % 100 01/03/24 0932   Post-Procedure Length (cm) 2 cm 11/21/23 0959   Post-Procedure Width (cm) 0.9 cm 11/21/23 0959   Post-Procedure Depth (cm) 0.3 cm 11/21/23 0959   Post-Procedure Surface Area (cm^2) 1.8 cm^2 11/21/23 0959   Post-Procedure Volume (cm^3) 0.54 cm^3 11/21/23 0959   Wound Assessment Dry 01/03/24 0932   Drainage Amount Scant (moist but unmeasurable) 01/03/24 0932   Drainage Description Serosanguinous 01/03/24 0932   Odor None 01/03/24 0932   Pamela-wound Assessment Intact 01/03/24 0932   Margins Defined edges 01/03/24 0932   Wound Thickness Description not for Pressure Injury Full thickness 01/03/24 0932   Number of days: 92       Right ankle     Multilayer Compression Wrap   (Not Unna) Below the Knee    NAME:  Stephanie Garrison  YOB: 1950  MEDICAL RECORD NUMBER:  [id number]  DATE:  1/3/2024    Multilayer compression wrap: Removed old Multilayer wrap if indicated and wash leg with mild soap/water.  Applied moisturizing agent to dry skin as needed.   Applied primary and secondary dressing as

## 2024-01-03 NOTE — TELEPHONE ENCOUNTER
Writer received letter in the mail addressed to Dr. Cary with negative FIT test results from 12/15/23. Patient was seen in office today for wound care nurse visit and explained she completed the FIT test as part of a screening program through her insurance and the results were supposed to be sent to her PCP.     Letter and FIT results scanned into chart and results faxed to Dr. Enriquez office at this time.

## 2024-01-03 NOTE — PLAN OF CARE
Problem: Pain  Goal: Verbalizes/displays adequate comfort level or baseline comfort level  Outcome: Progressing  Flowsheets (Taken 1/3/2024 9159)  Verbalizes/displays adequate comfort level or baseline comfort level: Assess pain using appropriate pain scale     Problem: Cognitive:  Goal: Knowledge of wound care  Description: Knowledge of wound care  Outcome: Progressing  Goal: Understands risk factors for wounds  Description: Understands risk factors for wounds  Outcome: Progressing     Problem: Wound:  Goal: Will show signs of wound healing; wound closure and no evidence of infection  Description: Will show signs of wound healing; wound closure and no evidence of infection  Outcome: Progressing     Problem: Venous:  Goal: Signs of wound healing will improve  Description: Signs of wound healing will improve  Outcome: Progressing     Problem: Compression therapy:  Goal: Will be free from complications associated with compression therapy  Description: Will be free from complications associated with compression therapy  Outcome: Progressing     Problem: Weight control:  Goal: Ability to maintain an optimal weight for height and age will be supported  Description: Ability to maintain an optimal weight for height and age will be supported  Outcome: Progressing     Problem: Falls - Risk of:  Goal: Will remain free from falls  Description: Will remain free from falls  Outcome: Progressing

## 2024-01-10 ENCOUNTER — HOSPITAL ENCOUNTER (OUTPATIENT)
Dept: WOUND CARE | Age: 74
Discharge: HOME | End: 2024-01-11
Attending: SURGERY
Payer: MEDICARE

## 2024-01-10 VITALS
BODY MASS INDEX: 39.2 KG/M2 | RESPIRATION RATE: 20 BRPM | SYSTOLIC BLOOD PRESSURE: 136 MMHG | HEIGHT: 62 IN | TEMPERATURE: 99 F | DIASTOLIC BLOOD PRESSURE: 76 MMHG | HEART RATE: 84 BPM | WEIGHT: 213 LBS

## 2024-01-10 PROCEDURE — 99213 OFFICE O/P EST LOW 20 MIN: CPT

## 2024-01-10 ASSESSMENT — PAIN SCALES - GENERAL: PAINLEVEL_OUTOF10: 0

## 2024-01-10 NOTE — PROGRESS NOTES
Patient presented for dressing change per order of Dr. Capone. Old dressing removed. Periwound and ulcer(s) cleansed with normal saline. Would is completely healed. Compression stocking applied to right leg. Advised patient to keep previous wound covered for protection and wear compression stockings daily. Advised patient to start using lymphedema pumps 2x daily as ordered before wound care. Patient verbalized understanding.

## 2024-01-24 ENCOUNTER — HOSPITAL ENCOUNTER (OUTPATIENT)
Dept: WOUND CARE | Age: 74
Discharge: HOME | End: 2024-01-25
Attending: SURGERY
Payer: MEDICARE

## 2024-01-24 VITALS
TEMPERATURE: 98.6 F | RESPIRATION RATE: 16 BRPM | DIASTOLIC BLOOD PRESSURE: 72 MMHG | SYSTOLIC BLOOD PRESSURE: 114 MMHG | HEART RATE: 70 BPM | WEIGHT: 213 LBS | BODY MASS INDEX: 39.2 KG/M2 | HEIGHT: 62 IN

## 2024-01-24 DIAGNOSIS — I83.013 VENOUS STASIS ULCER OF ANKLE, RIGHT (HCC): Primary | ICD-10-CM

## 2024-01-24 DIAGNOSIS — L97.319 VENOUS STASIS ULCER OF ANKLE, RIGHT (HCC): Primary | ICD-10-CM

## 2024-01-24 PROCEDURE — 99213 OFFICE O/P EST LOW 20 MIN: CPT

## 2024-01-24 PROCEDURE — 99213 OFFICE O/P EST LOW 20 MIN: CPT | Performed by: SURGERY

## 2024-01-24 ASSESSMENT — PAIN SCALES - GENERAL: PAINLEVEL_OUTOF10: 0

## 2024-01-24 NOTE — PROGRESS NOTES
Subjective   Stephanie Garrison is a 73 y.o. female who presents today for follow-up of a right ankle wound.  When I last saw her her venous stasis ulcer had actually healed.  We had her go back to compression garments.  Apparently about a week after that she developed a small blister at the medial ankle but it healed quickly and since that time has had no problems.  She has resumed her per compression garments as well as lymphedema pumps.  She is now following with podiatry on a normal basis for foot exams.  No new complaints today.  Comes in for recheck due to the recent blister just to make sure that that it healed.  Denies any problems today.    Past Medical History:   Diagnosis Date    DVT (deep venous thrombosis) (HCC)     RLE    Gilbert's syndrome     Osteoarthritis        Past Surgical History:   Procedure Laterality Date    COLONOSCOPY  2012    HYSTERECTOMY, TOTAL ABDOMINAL (CERVIX REMOVED)  1999       Current Outpatient Medications   Medication Sig Dispense Refill    oxybutynin (DITROPAN) 5 MG tablet take 1 tablet by mouth every 12 hours for URINARY SYMPTOMS      celecoxib (CELEBREX) 200 MG capsule take 1 capsule by mouth once daily      atorvastatin (LIPITOR) 20 MG tablet Take 1 tablet by mouth daily      calcium carbonate 600 MG TABS tablet Take 1 tablet by mouth daily      vitamin D (CHOLECALCIFEROL) 25 MCG (1000 UT) TABS tablet Take 1 tablet by mouth daily Takes 4x/day. Per pharmacy as directed      warfarin (COUMADIN) 5 MG tablet Take 1 tablet by mouth Takes 1-1.5 tabs (5-7.5 mg total) by mouth daily.       No current facility-administered medications for this encounter.       No Known Allergies    Family History   Problem Relation Age of Onset    Diabetes Mother     Coronary Art Dis Mother     Arthritis Mother     High Blood Pressure Mother     Cancer Father        Social History     Socioeconomic History    Marital status:      Spouse name: Not on file    Number of children: Not on file

## 2024-01-24 NOTE — ADDENDUM NOTE
Encounter addended by: Shira Dhillon RN on: 1/24/2024 2:54 PM   Actions taken: Charge Capture section accepted